# Patient Record
Sex: MALE | Race: WHITE | NOT HISPANIC OR LATINO | URBAN - METROPOLITAN AREA
[De-identification: names, ages, dates, MRNs, and addresses within clinical notes are randomized per-mention and may not be internally consistent; named-entity substitution may affect disease eponyms.]

---

## 2023-07-16 ENCOUNTER — INPATIENT (INPATIENT)
Facility: HOSPITAL | Age: 83
LOS: 2 days | Discharge: HOME | DRG: 71 | End: 2023-07-19
Attending: HOSPITALIST | Admitting: STUDENT IN AN ORGANIZED HEALTH CARE EDUCATION/TRAINING PROGRAM
Payer: MEDICARE

## 2023-07-16 VITALS
RESPIRATION RATE: 17 BRPM | TEMPERATURE: 98 F | WEIGHT: 143.3 LBS | HEART RATE: 67 BPM | HEIGHT: 69 IN | DIASTOLIC BLOOD PRESSURE: 83 MMHG | OXYGEN SATURATION: 99 % | SYSTOLIC BLOOD PRESSURE: 148 MMHG

## 2023-07-16 LAB
ALBUMIN SERPL ELPH-MCNC: 3.9 G/DL — SIGNIFICANT CHANGE UP (ref 3.4–5)
ALP SERPL-CCNC: 81 U/L — SIGNIFICANT CHANGE UP (ref 40–120)
ALT FLD-CCNC: 24 U/L — SIGNIFICANT CHANGE UP (ref 12–42)
AMPHET UR-MCNC: NEGATIVE — SIGNIFICANT CHANGE UP
ANION GAP SERPL CALC-SCNC: 11 MMOL/L — SIGNIFICANT CHANGE UP (ref 9–16)
APPEARANCE UR: CLEAR — SIGNIFICANT CHANGE UP
AST SERPL-CCNC: 30 U/L — SIGNIFICANT CHANGE UP (ref 15–37)
BACTERIA # UR AUTO: PRESENT /HPF — SIGNIFICANT CHANGE UP
BARBITURATES UR SCN-MCNC: NEGATIVE — SIGNIFICANT CHANGE UP
BASOPHILS # BLD AUTO: 0.03 K/UL — SIGNIFICANT CHANGE UP (ref 0–0.2)
BASOPHILS NFR BLD AUTO: 0.3 % — SIGNIFICANT CHANGE UP (ref 0–2)
BENZODIAZ UR-MCNC: NEGATIVE — SIGNIFICANT CHANGE UP
BILIRUB SERPL-MCNC: 0.7 MG/DL — SIGNIFICANT CHANGE UP (ref 0.2–1.2)
BILIRUB UR-MCNC: NEGATIVE — SIGNIFICANT CHANGE UP
BUN SERPL-MCNC: 11 MG/DL — SIGNIFICANT CHANGE UP (ref 7–23)
CALCIUM SERPL-MCNC: 9.4 MG/DL — SIGNIFICANT CHANGE UP (ref 8.5–10.5)
CHLORIDE SERPL-SCNC: 97 MMOL/L — SIGNIFICANT CHANGE UP (ref 96–108)
CO2 SERPL-SCNC: 26 MMOL/L — SIGNIFICANT CHANGE UP (ref 22–31)
COCAINE METAB.OTHER UR-MCNC: NEGATIVE — SIGNIFICANT CHANGE UP
COLOR SPEC: YELLOW — SIGNIFICANT CHANGE UP
CREAT SERPL-MCNC: 0.93 MG/DL — SIGNIFICANT CHANGE UP (ref 0.5–1.3)
DIFF PNL FLD: NEGATIVE — SIGNIFICANT CHANGE UP
EGFR: 83 ML/MIN/1.73M2 — SIGNIFICANT CHANGE UP
EOSINOPHIL # BLD AUTO: 0.09 K/UL — SIGNIFICANT CHANGE UP (ref 0–0.5)
EOSINOPHIL NFR BLD AUTO: 1 % — SIGNIFICANT CHANGE UP (ref 0–6)
EPI CELLS # UR: PRESENT
ETHANOL SERPL-MCNC: <3 MG/DL — SIGNIFICANT CHANGE UP
GLUCOSE SERPL-MCNC: 112 MG/DL — HIGH (ref 70–99)
GLUCOSE UR QL: NEGATIVE MG/DL — SIGNIFICANT CHANGE UP
HCT VFR BLD CALC: 40 % — SIGNIFICANT CHANGE UP (ref 39–50)
HGB BLD-MCNC: 13.8 G/DL — SIGNIFICANT CHANGE UP (ref 13–17)
IMM GRANULOCYTES NFR BLD AUTO: 0.2 % — SIGNIFICANT CHANGE UP (ref 0–0.9)
KETONES UR-MCNC: NEGATIVE MG/DL — SIGNIFICANT CHANGE UP
LEUKOCYTE ESTERASE UR-ACNC: ABNORMAL
LYMPHOCYTES # BLD AUTO: 1.76 K/UL — SIGNIFICANT CHANGE UP (ref 1–3.3)
LYMPHOCYTES # BLD AUTO: 19.8 % — SIGNIFICANT CHANGE UP (ref 13–44)
MAGNESIUM SERPL-MCNC: 1.9 MG/DL — SIGNIFICANT CHANGE UP (ref 1.6–2.6)
MCHC RBC-ENTMCNC: 33.3 PG — SIGNIFICANT CHANGE UP (ref 27–34)
MCHC RBC-ENTMCNC: 34.5 GM/DL — SIGNIFICANT CHANGE UP (ref 32–36)
MCV RBC AUTO: 96.4 FL — SIGNIFICANT CHANGE UP (ref 80–100)
METHADONE UR-MCNC: NEGATIVE — SIGNIFICANT CHANGE UP
MONOCYTES # BLD AUTO: 1.05 K/UL — HIGH (ref 0–0.9)
MONOCYTES NFR BLD AUTO: 11.8 % — SIGNIFICANT CHANGE UP (ref 2–14)
NEUTROPHILS # BLD AUTO: 5.95 K/UL — SIGNIFICANT CHANGE UP (ref 1.8–7.4)
NEUTROPHILS NFR BLD AUTO: 66.9 % — SIGNIFICANT CHANGE UP (ref 43–77)
NITRITE UR-MCNC: NEGATIVE — SIGNIFICANT CHANGE UP
NRBC # BLD: 0 /100 WBCS — SIGNIFICANT CHANGE UP (ref 0–0)
OPIATES UR-MCNC: NEGATIVE — SIGNIFICANT CHANGE UP
PCP SPEC-MCNC: SIGNIFICANT CHANGE UP
PCP UR-MCNC: NEGATIVE — SIGNIFICANT CHANGE UP
PH UR: 6.5 — SIGNIFICANT CHANGE UP (ref 5–8)
PLATELET # BLD AUTO: 249 K/UL — SIGNIFICANT CHANGE UP (ref 150–400)
POTASSIUM SERPL-MCNC: 3.8 MMOL/L — SIGNIFICANT CHANGE UP (ref 3.5–5.3)
POTASSIUM SERPL-SCNC: 3.8 MMOL/L — SIGNIFICANT CHANGE UP (ref 3.5–5.3)
PROT SERPL-MCNC: 7.8 G/DL — SIGNIFICANT CHANGE UP (ref 6.4–8.2)
PROT UR-MCNC: NEGATIVE MG/DL — SIGNIFICANT CHANGE UP
RBC # BLD: 4.15 M/UL — LOW (ref 4.2–5.8)
RBC # FLD: 13.2 % — SIGNIFICANT CHANGE UP (ref 10.3–14.5)
RBC CASTS # UR COMP ASSIST: 0 /HPF — SIGNIFICANT CHANGE UP (ref 0–4)
SODIUM SERPL-SCNC: 134 MMOL/L — SIGNIFICANT CHANGE UP (ref 132–145)
SP GR SPEC: 1.01 — SIGNIFICANT CHANGE UP (ref 1–1.03)
THC UR QL: NEGATIVE — SIGNIFICANT CHANGE UP
UROBILINOGEN FLD QL: 1 MG/DL — SIGNIFICANT CHANGE UP (ref 0.2–1)
WBC # BLD: 8.9 K/UL — SIGNIFICANT CHANGE UP (ref 3.8–10.5)
WBC # FLD AUTO: 8.9 K/UL — SIGNIFICANT CHANGE UP (ref 3.8–10.5)
WBC UR QL: 3 /HPF — SIGNIFICANT CHANGE UP (ref 0–5)

## 2023-07-16 PROCEDURE — 70450 CT HEAD/BRAIN W/O DYE: CPT | Mod: 26,MH

## 2023-07-16 PROCEDURE — 71046 X-RAY EXAM CHEST 2 VIEWS: CPT | Mod: 26

## 2023-07-16 PROCEDURE — 99285 EMERGENCY DEPT VISIT HI MDM: CPT | Mod: FS

## 2023-07-16 RX ORDER — DIVALPROEX SODIUM 500 MG/1
500 TABLET, DELAYED RELEASE ORAL ONCE
Refills: 0 | Status: COMPLETED | OUTPATIENT
Start: 2023-07-16 | End: 2023-07-16

## 2023-07-16 RX ORDER — ALBUTEROL 90 UG/1
1 AEROSOL, METERED ORAL ONCE
Refills: 0 | Status: COMPLETED | OUTPATIENT
Start: 2023-07-16 | End: 2023-07-16

## 2023-07-16 RX ORDER — OLANZAPINE 15 MG/1
10 TABLET, FILM COATED ORAL ONCE
Refills: 0 | Status: COMPLETED | OUTPATIENT
Start: 2023-07-16 | End: 2023-07-16

## 2023-07-16 RX ORDER — SODIUM CHLORIDE 9 MG/ML
1000 INJECTION INTRAMUSCULAR; INTRAVENOUS; SUBCUTANEOUS ONCE
Refills: 0 | Status: COMPLETED | OUTPATIENT
Start: 2023-07-16 | End: 2023-07-16

## 2023-07-16 RX ORDER — BUPROPION HYDROCHLORIDE 150 MG/1
300 TABLET, EXTENDED RELEASE ORAL DAILY
Refills: 0 | Status: DISCONTINUED | OUTPATIENT
Start: 2023-07-16 | End: 2023-07-19

## 2023-07-16 RX ADMIN — ALBUTEROL 1 PUFF(S): 90 AEROSOL, METERED ORAL at 15:10

## 2023-07-16 RX ADMIN — BUPROPION HYDROCHLORIDE 300 MILLIGRAM(S): 150 TABLET, EXTENDED RELEASE ORAL at 12:47

## 2023-07-16 RX ADMIN — OLANZAPINE 10 MILLIGRAM(S): 15 TABLET, FILM COATED ORAL at 12:48

## 2023-07-16 RX ADMIN — DIVALPROEX SODIUM 500 MILLIGRAM(S): 500 TABLET, DELAYED RELEASE ORAL at 12:48

## 2023-07-16 RX ADMIN — SODIUM CHLORIDE 2000 MILLILITER(S): 9 INJECTION INTRAMUSCULAR; INTRAVENOUS; SUBCUTANEOUS at 12:30

## 2023-07-16 NOTE — H&P ADULT - PROBLEM SELECTOR PLAN 6
On synthroid 100mcg daily. Pt noted to take cholestyramine as well    - TSH  - continue synthroid 100mcg daily, take only in morning without cholestyramine co-administration

## 2023-07-16 NOTE — ED PROVIDER NOTE - PROGRESS NOTE DETAILS
As per Assisted living supervisor Kiera Wilhelm . Patient cannot return to the resident until he is assessed by psych and social work. As per Assisted living supervisor Kiera Wilhelm . Patient cannot return to the resident until he is assessed by psych and social work for capacity and placement.

## 2023-07-16 NOTE — H&P ADULT - ASSESSMENT
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

## 2023-07-16 NOTE — ED ADULT NURSE NOTE - NSFALLHARMRISKINTERV_ED_ALL_ED

## 2023-07-16 NOTE — ED ADULT NURSE NOTE - CHIEF COMPLAINT QUOTE
patient BIBA from [facility] on [address] for AMS; staff called as patient "seems forgetful"; EMS staff found out patient is from [facility] in [facility]; EMS spoke with emergency contact Kiera Wilhelm ([phone]) who said he eloped from nursing home and there was a missing persons report on him; arrives on 3LNC; is on home 02 but has been withotu for 1 day

## 2023-07-16 NOTE — H&P ADULT - PROBLEM SELECTOR PLAN 1
BIBEMS after being found in hotel lobby confused. Pt is from [facility], supervisor is Kiera Wilhelm and was last seen at facility yesterday at 3:30PM. At time of examination, pt is AAOx3 however admits to being forgetful at times during past year. Pt reports being non-compliant with antipsychotic meds and as a result may have inadequate treatment of his underlying psychological disorders including schizophrenia and bipolar. CT head without acute pathology (has age indeterminant right basal luncar infarct)    - Monitor mental status  - TSH, b12, syph  - continue psychiatric medications

## 2023-07-16 NOTE — H&P ADULT - NSICDXPASTMEDICALHX_GEN_ALL_CORE_FT
PAST MEDICAL HISTORY:  AML (acute myeloid leukemia)     Bipolar disorder     BPH without urinary obstruction     Chronic obstructive pulmonary disease (COPD)     GERD (gastroesophageal reflux disease)     History of depression     HTN (hypertension)     Hypothyroidism     Schizophrenia

## 2023-07-16 NOTE — H&P ADULT - NSHPPHYSICALEXAM_GEN_ALL_CORE
PHYSICAL EXAM:  GENERAL: Pt lying in bed comfortably in NAD  HEAD:  Atraumatic  EYES: EOMI, PERRL, conjunctiva and sclera clear  ENT: Moist mucous membranes  NECK: Supple, No JVD  CHEST/LUNG: Clear to auscultation bilaterally; No rales, rhonchi, wheezing or rubs. Unlabored respirations  HEART: Regular rate and rhythm; No murmurs, rubs, or gallops  ABDOMEN: Bowel sounds present; Large abdomen, soft, nontender with well healed left side abd scar. No guarding or rigidity  EXTREMITIES:  2+ Peripheral Pulses, brisk capillary refill. No clubbing, cyanosis, or edema  NERVOUS SYSTEM:  Alert & Oriented X3, speech clear. Answers questions appropriately. Facial movements symmetrical, no facial droop, tongue protrusion midline. Full and equal 5/5 strength B/L upper and lower extremities. Sensation intact. No motor drift. No deficits   MSK: FROM x 4 extremities   SKIN: No rashes or lesions

## 2023-07-16 NOTE — ED PROVIDER NOTE - CLINICAL SUMMARY MEDICAL DECISION MAKING FREE TEXT BOX
PMHx COPD on home O2 2L, bipolar d/o, depression, schizophrenia, HTN BIB EMS for confusion. patient has been missing from his Assisted living since 3:30p yesterday. States that he left his facility because he was bored. denies any fall, head trauma, chest pain. admits to chronic SOB and cough. on exam patient is welling appearing, neurovascularly intact. will check labs, do imaging and continue to monitor.

## 2023-07-16 NOTE — ED ADULT NURSE NOTE - OBJECTIVE STATEMENT
Pt BIBA from [facility] on [address] for AMS; staff called as patient "seems forgetful".EMS staff found out patient is from [facility] in [facility]; EMS spoke with emergency contact Kiera Melonie ([phone]) who said he eloped from nursing home and there was a missing persons report on him; arrives on 3LNC; is on home 02 but has been without for 1 day. Pt denies all complaints, unsure why he is in the ER.

## 2023-07-16 NOTE — H&P ADULT - HISTORY OF PRESENT ILLNESS
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 Patient is from [facility] and per pt he left yesterday to come to [facility] to explore the [place] and wanted to "look for a job" as he does not want to be retired. Patient is alert and oriented x3 and answering questions appropriately. Does not like taking his antipsychotics and sometimes has poor compliance with them, reportedly has not taken them for last ~3 days. Recently diagnosed with AML and started on imatinib. No recent illness. Does endorse some dyspnea on exertion today but is comfortable in bed. Denies headache, vision change, dizziness, fever, chills, chest pain, abd pain, dysuria.    In the ED:    - VS: Tmax: 98.2, HR: 67, BP: 148/83, RR: 17, O2: 95% 3L NC    - Pertinent Labs: UA trace LE    - Imaging: CTH: AGe-appropriate involutional changes. Age indeterminant right basal gangilal lacunar infarct. No acute intracranial hemorrhage.  CXR: mild blunting of costophrenic angle, possible bilateral small pleural effusion  EKG:     - Treatment/interventions: bupropion 300mg x1, depakote 500mg x1, zyprexa 10mg x1, 1L NS, albuterol x1    PMHx: COPD (home 2L), schizophrenia, bipolar disorder, depression, HTN, BPH, GERD, hypothyroidism, recently diagnosed AML   PSHx: see below  Meds: See med rec  Allergies: NKDA  Social: see below

## 2023-07-16 NOTE — H&P ADULT - PROBLEM SELECTOR PLAN 2
On home 2L NC and trelegy ellipta 1 puff daily    - continue O2 goal O2 88% or higher  - Symbicort and Spiriva daily  - duonebs q6h PRN

## 2023-07-16 NOTE — H&P ADULT - PROBLEM SELECTOR PLAN 8
Mildly elevated BP at this time. No anti-HTN med on medication list from facility    - Start anti-HTN as needed

## 2023-07-16 NOTE — H&P ADULT - PROBLEM SELECTOR PLAN 5
On buproprion 300mgdaily, buspirone 5mg tid, sertraline 25mg daily, zyprexa 10mg morning and 7.5mg bedtime. No suicidal thoughts or thoughts of self harm at this time. Reports mood as okay and stable    - continue buproprion 300mgdaily, buspirone 5mg tid, sertraline 25mg daily, zyprexa 10mg morning and 7.5mg bedtime

## 2023-07-16 NOTE — H&P ADULT - NSHPLABSRESULTS_GEN_ALL_CORE
LABS:  cret                        13.8   8.90  )-----------( 249      ( 16 Jul 2023 11:59 )             40.0     07-16    134  |  97  |  11  ----------------------------<  112<H>  3.8   |  26  |  0.93    Ca    9.4      16 Jul 2023 11:59  Mg     1.9     07-16    TPro  7.8  /  Alb  3.9  /  TBili  0.7  /  DBili  x   /  AST  30  /  ALT  24  /  AlkPhos  81  07-16

## 2023-07-16 NOTE — H&P ADULT - ATTENDING COMMENTS
Patient was seen and examined at bedside on 7/17/2023 at 330 pm. Patient reports that he feels well. Denies CP, SOB. ROS is otherwise negative. Vitals, labwork and pertinent imaging reviewed. Physical exam - NAD, AAO x 4, PERRLA, EOMI, supple neck, chest - CTA b/l, CV - rrr, s1s2, no m/r/g, abd - soft, + BS, ext - wwp, psych - normal affect, skin - no rash, back - midline    Plan  -Patient is medically ready for d/c  -Needs to have nodule seen on CXR followed up as an outpatient

## 2023-07-16 NOTE — ED ADULT TRIAGE NOTE - CHIEF COMPLAINT QUOTE
patient BIBA from [facility] on [address] for AMS; staff called as patient "seems forgetful"; EMS staff found out patient is from [facility] in [facility]; EMS spoke with emergency contact Kiera Wilhelm ([phone]) who said he eloped from nursing home and there was a missing persons report on him; arrives on 3LNC; is on home 02 but has been withotu for 1 day

## 2023-07-16 NOTE — H&P ADULT - PROBLEM SELECTOR PLAN 7
Unclear when patient was diagnosed with AML. Per patient, it was recent and takes imatinib 400mg daily    - Obtain collateral for nursing home regarding AML diagnosis and restart imatinib as appropriate

## 2023-07-16 NOTE — H&P ADULT - NSHPSOCIALHISTORY_GEN_ALL_CORE
Lives at [facility] living [facility]. Smokes 1-3 cigs/day. No alcohol or recreational drug use.

## 2023-07-16 NOTE — ED PROVIDER NOTE - OBJECTIVE STATEMENT
PMHX COPD on 2L home O2 PMHX COPD on 2L home O2, schizophrenia, bipolar d/o, depression, HTN, BPH BIB EMS for AMS. as per EMS patient was found confused in the lobby of a hotel. Patient admits to chronic cough and wheezing. does not recall his medications. states that he left his residence in [facility] because he was bored and decided [place] come to the city. As per [facility] supervisor Kiera Wilhelm patient was last seen at the residence at 3:30p yesterday, has not taken his medications since that time. PMHX COPD on 2L home O2, schizophrenia, bipolar d/o, depression, HTN, BPH BIB EMS for AMS. as per EMS patient was found confused in the lobby of a hotel. Patient admits to chronic cough and wheezing. does not recall his medications. states that he left his residence in [facility] because he was bored and decided to come to the city. Denies any suicidal homicidal ideation. As per [facility] supervisor Kiera Wilhelm patient was last seen at the residence at 3:30p yesterday, has not taken his medications since that time.  Psych medications:   Bupropion XL 300mg daily  Buspirone 5mg tid  divalproic acid 500mg bid  zyprexa 10mg qd, 7.5mg qhs  sertraline 50mg daily    med list scanned into chart

## 2023-07-16 NOTE — ED ADULT TRIAGE NOTE - WEIGHT IN LBS
Patient returned phone call and was notified of blood test results. She verbalized understanding and is in agreement to start statin therapy. She has never taken statin therapy before.    [facility] on [address] in [place] on file.   143.3

## 2023-07-16 NOTE — H&P ADULT - PROBLEM SELECTOR PLAN 10
Fluids: None  Electrolytes: Replete K<4 and Mg<2  Nutrition: Regular diet  DVT ppx: lovenox  Code: Full code  Dispo: [facility]

## 2023-07-16 NOTE — ED PROVIDER NOTE - ATTENDING APP SHARED VISIT CONTRIBUTION OF CARE
pt presents by ems, he escaped from a nursing home facility outside of [place]. Will get basic work up and likely admit for social evaluation to try to place pt back. no social work available at this time.

## 2023-07-16 NOTE — H&P ADULT - PROBLEM SELECTOR PLAN 4
On divalproic 500mg bid, zyprexa 10mg morning and 7.5mg bedtime    - continue divalproic 500mg bid, zyprexa 10mg morning and 7.5mg bedtime

## 2023-07-16 NOTE — H&P ADULT - PROBLEM SELECTOR PLAN 3
On divalproic 500mg bid, zyprexa 10mg morning and 7.5mg bedtime,     -  continue divalproic 500mg bid, zyprexa 10mg morning and 7.5mg bedtime

## 2023-07-17 ENCOUNTER — TRANSCRIPTION ENCOUNTER (OUTPATIENT)
Age: 83
End: 2023-07-17

## 2023-07-17 DIAGNOSIS — Z86.59 PERSONAL HISTORY OF OTHER MENTAL AND BEHAVIORAL DISORDERS: ICD-10-CM

## 2023-07-17 DIAGNOSIS — K21.9 GASTRO-ESOPHAGEAL REFLUX DISEASE WITHOUT ESOPHAGITIS: ICD-10-CM

## 2023-07-17 DIAGNOSIS — Z90.49 ACQUIRED ABSENCE OF OTHER SPECIFIED PARTS OF DIGESTIVE TRACT: Chronic | ICD-10-CM

## 2023-07-17 DIAGNOSIS — C92.00 ACUTE MYELOBLASTIC LEUKEMIA, NOT HAVING ACHIEVED REMISSION: ICD-10-CM

## 2023-07-17 DIAGNOSIS — Z00.00 ENCOUNTER FOR GENERAL ADULT MEDICAL EXAMINATION WITHOUT ABNORMAL FINDINGS: ICD-10-CM

## 2023-07-17 DIAGNOSIS — J44.9 CHRONIC OBSTRUCTIVE PULMONARY DISEASE, UNSPECIFIED: ICD-10-CM

## 2023-07-17 DIAGNOSIS — Z29.9 ENCOUNTER FOR PROPHYLACTIC MEASURES, UNSPECIFIED: ICD-10-CM

## 2023-07-17 DIAGNOSIS — F31.9 BIPOLAR DISORDER, UNSPECIFIED: ICD-10-CM

## 2023-07-17 DIAGNOSIS — E03.9 HYPOTHYROIDISM, UNSPECIFIED: ICD-10-CM

## 2023-07-17 DIAGNOSIS — F20.9 SCHIZOPHRENIA, UNSPECIFIED: ICD-10-CM

## 2023-07-17 DIAGNOSIS — G93.41 METABOLIC ENCEPHALOPATHY: ICD-10-CM

## 2023-07-17 DIAGNOSIS — I10 ESSENTIAL (PRIMARY) HYPERTENSION: ICD-10-CM

## 2023-07-17 DIAGNOSIS — Z98.890 OTHER SPECIFIED POSTPROCEDURAL STATES: Chronic | ICD-10-CM

## 2023-07-17 DIAGNOSIS — N40.0 BENIGN PROSTATIC HYPERPLASIA WITHOUT LOWER URINARY TRACT SYMPTOMS: ICD-10-CM

## 2023-07-17 LAB
ANION GAP SERPL CALC-SCNC: 4 MMOL/L — LOW (ref 5–17)
BUN SERPL-MCNC: 6 MG/DL — LOW (ref 7–23)
CALCIUM SERPL-MCNC: 8.4 MG/DL — SIGNIFICANT CHANGE UP (ref 8.4–10.5)
CHLORIDE SERPL-SCNC: 105 MMOL/L — SIGNIFICANT CHANGE UP (ref 96–108)
CO2 SERPL-SCNC: 27 MMOL/L — SIGNIFICANT CHANGE UP (ref 22–31)
CREAT SERPL-MCNC: 0.8 MG/DL — SIGNIFICANT CHANGE UP (ref 0.5–1.3)
EGFR: 89 ML/MIN/1.73M2 — SIGNIFICANT CHANGE UP
GLUCOSE SERPL-MCNC: 86 MG/DL — SIGNIFICANT CHANGE UP (ref 70–99)
HCT VFR BLD CALC: 39 % — SIGNIFICANT CHANGE UP (ref 39–50)
HGB BLD-MCNC: 13 G/DL — SIGNIFICANT CHANGE UP (ref 13–17)
MAGNESIUM SERPL-MCNC: 1.8 MG/DL — SIGNIFICANT CHANGE UP (ref 1.6–2.6)
MCHC RBC-ENTMCNC: 32.8 PG — SIGNIFICANT CHANGE UP (ref 27–34)
MCHC RBC-ENTMCNC: 33.3 GM/DL — SIGNIFICANT CHANGE UP (ref 32–36)
MCV RBC AUTO: 98.5 FL — SIGNIFICANT CHANGE UP (ref 80–100)
NRBC # BLD: 0 /100 WBCS — SIGNIFICANT CHANGE UP (ref 0–0)
PHOSPHATE SERPL-MCNC: 2.3 MG/DL — LOW (ref 2.5–4.5)
PLATELET # BLD AUTO: 219 K/UL — SIGNIFICANT CHANGE UP (ref 150–400)
POTASSIUM SERPL-MCNC: 4 MMOL/L — SIGNIFICANT CHANGE UP (ref 3.5–5.3)
POTASSIUM SERPL-SCNC: 4 MMOL/L — SIGNIFICANT CHANGE UP (ref 3.5–5.3)
RBC # BLD: 3.96 M/UL — LOW (ref 4.2–5.8)
RBC # FLD: 13.4 % — SIGNIFICANT CHANGE UP (ref 10.3–14.5)
SODIUM SERPL-SCNC: 136 MMOL/L — SIGNIFICANT CHANGE UP (ref 135–145)
T PALLIDUM AB TITR SER: NEGATIVE — SIGNIFICANT CHANGE UP
TSH SERPL-MCNC: 2.28 UIU/ML — SIGNIFICANT CHANGE UP (ref 0.27–4.2)
VIT B12 SERPL-MCNC: 800 PG/ML — SIGNIFICANT CHANGE UP (ref 232–1245)
WBC # BLD: 7.78 K/UL — SIGNIFICANT CHANGE UP (ref 3.8–10.5)
WBC # FLD AUTO: 7.78 K/UL — SIGNIFICANT CHANGE UP (ref 3.8–10.5)

## 2023-07-17 PROCEDURE — 99222 1ST HOSP IP/OBS MODERATE 55: CPT

## 2023-07-17 PROCEDURE — 99223 1ST HOSP IP/OBS HIGH 75: CPT | Mod: GC,AI

## 2023-07-17 RX ORDER — PREGABALIN 225 MG/1
1 CAPSULE ORAL
Refills: 0 | DISCHARGE

## 2023-07-17 RX ORDER — CHOLESTYRAMINE 4 G/9G
4 POWDER, FOR SUSPENSION ORAL
Refills: 0 | DISCHARGE

## 2023-07-17 RX ORDER — TIOTROPIUM BROMIDE 18 UG/1
2 CAPSULE ORAL; RESPIRATORY (INHALATION) DAILY
Refills: 0 | Status: DISCONTINUED | OUTPATIENT
Start: 2023-07-17 | End: 2023-07-19

## 2023-07-17 RX ORDER — TAMSULOSIN HYDROCHLORIDE 0.4 MG/1
0.4 CAPSULE ORAL AT BEDTIME
Refills: 0 | Status: DISCONTINUED | OUTPATIENT
Start: 2023-07-17 | End: 2023-07-19

## 2023-07-17 RX ORDER — PRAZOSIN HCL 2 MG
1 CAPSULE ORAL
Refills: 0 | DISCHARGE

## 2023-07-17 RX ORDER — SODIUM,POTASSIUM PHOSPHATES 278-250MG
1 POWDER IN PACKET (EA) ORAL ONCE
Refills: 0 | Status: COMPLETED | OUTPATIENT
Start: 2023-07-17 | End: 2023-07-17

## 2023-07-17 RX ORDER — ACETAMINOPHEN 500 MG
650 TABLET ORAL ONCE
Refills: 0 | Status: COMPLETED | OUTPATIENT
Start: 2023-07-17 | End: 2023-07-17

## 2023-07-17 RX ORDER — OLANZAPINE 15 MG/1
10 TABLET, FILM COATED ORAL
Refills: 0 | Status: DISCONTINUED | OUTPATIENT
Start: 2023-07-17 | End: 2023-07-19

## 2023-07-17 RX ORDER — BUDESONIDE AND FORMOTEROL FUMARATE DIHYDRATE 160; 4.5 UG/1; UG/1
2 AEROSOL RESPIRATORY (INHALATION)
Refills: 0 | Status: DISCONTINUED | OUTPATIENT
Start: 2023-07-17 | End: 2023-07-19

## 2023-07-17 RX ORDER — SERTRALINE 25 MG/1
25 TABLET, FILM COATED ORAL DAILY
Refills: 0 | Status: DISCONTINUED | OUTPATIENT
Start: 2023-07-17 | End: 2023-07-19

## 2023-07-17 RX ORDER — OLANZAPINE 15 MG/1
7.5 TABLET, FILM COATED ORAL AT BEDTIME
Refills: 0 | Status: DISCONTINUED | OUTPATIENT
Start: 2023-07-17 | End: 2023-07-19

## 2023-07-17 RX ORDER — ENOXAPARIN SODIUM 100 MG/ML
40 INJECTION SUBCUTANEOUS EVERY 24 HOURS
Refills: 0 | Status: DISCONTINUED | OUTPATIENT
Start: 2023-07-17 | End: 2023-07-19

## 2023-07-17 RX ORDER — FOLIC ACID 0.8 MG
1 TABLET ORAL
Refills: 0 | DISCHARGE

## 2023-07-17 RX ORDER — SERTRALINE 25 MG/1
1 TABLET, FILM COATED ORAL
Refills: 0 | DISCHARGE

## 2023-07-17 RX ORDER — OLANZAPINE 15 MG/1
1 TABLET, FILM COATED ORAL
Refills: 0 | DISCHARGE

## 2023-07-17 RX ORDER — THIAMINE MONONITRATE (VIT B1) 100 MG
1 TABLET ORAL
Refills: 0 | DISCHARGE

## 2023-07-17 RX ORDER — DIVALPROEX SODIUM 500 MG/1
500 TABLET, DELAYED RELEASE ORAL
Refills: 0 | Status: DISCONTINUED | OUTPATIENT
Start: 2023-07-17 | End: 2023-07-18

## 2023-07-17 RX ORDER — DIVALPROEX SODIUM 500 MG/1
1 TABLET, DELAYED RELEASE ORAL
Refills: 0 | DISCHARGE

## 2023-07-17 RX ORDER — LEVOTHYROXINE SODIUM 125 MCG
1 TABLET ORAL
Refills: 0 | DISCHARGE

## 2023-07-17 RX ORDER — BUPROPION HYDROCHLORIDE 150 MG/1
1 TABLET, EXTENDED RELEASE ORAL
Refills: 0 | DISCHARGE

## 2023-07-17 RX ORDER — FOLIC ACID 0.8 MG
1 TABLET ORAL DAILY
Refills: 0 | Status: DISCONTINUED | OUTPATIENT
Start: 2023-07-17 | End: 2023-07-19

## 2023-07-17 RX ORDER — IMATINIB MESYLATE 400 MG/1
1 TABLET, FILM COATED ORAL
Refills: 0 | DISCHARGE

## 2023-07-17 RX ORDER — FLUTICASONE FUROATE, UMECLIDINIUM BROMIDE AND VILANTEROL TRIFENATATE 200; 62.5; 25 UG/1; UG/1; UG/1
1 POWDER RESPIRATORY (INHALATION)
Refills: 0 | DISCHARGE

## 2023-07-17 RX ORDER — HYDROXYZINE HCL 10 MG
1 TABLET ORAL
Refills: 0 | DISCHARGE

## 2023-07-17 RX ORDER — LEVOTHYROXINE SODIUM 125 MCG
100 TABLET ORAL DAILY
Refills: 0 | Status: DISCONTINUED | OUTPATIENT
Start: 2023-07-17 | End: 2023-07-19

## 2023-07-17 RX ORDER — METHYLCELLULOSE 500 MG
2 TABLET ORAL
Refills: 0 | DISCHARGE

## 2023-07-17 RX ADMIN — TIOTROPIUM BROMIDE 2 PUFF(S): 18 CAPSULE ORAL; RESPIRATORY (INHALATION) at 12:57

## 2023-07-17 RX ADMIN — Medication 1 TABLET(S): at 10:07

## 2023-07-17 RX ADMIN — SERTRALINE 25 MILLIGRAM(S): 25 TABLET, FILM COATED ORAL at 12:56

## 2023-07-17 RX ADMIN — Medication 5 MILLIGRAM(S): at 14:31

## 2023-07-17 RX ADMIN — OLANZAPINE 10 MILLIGRAM(S): 15 TABLET, FILM COATED ORAL at 10:07

## 2023-07-17 RX ADMIN — ENOXAPARIN SODIUM 40 MILLIGRAM(S): 100 INJECTION SUBCUTANEOUS at 07:00

## 2023-07-17 RX ADMIN — Medication 1 MILLIGRAM(S): at 12:56

## 2023-07-17 RX ADMIN — Medication 5 MILLIGRAM(S): at 07:01

## 2023-07-17 RX ADMIN — Medication 5 MILLIGRAM(S): at 21:20

## 2023-07-17 RX ADMIN — Medication 650 MILLIGRAM(S): at 16:03

## 2023-07-17 RX ADMIN — Medication 100 MICROGRAM(S): at 07:01

## 2023-07-17 RX ADMIN — DIVALPROEX SODIUM 500 MILLIGRAM(S): 500 TABLET, DELAYED RELEASE ORAL at 10:09

## 2023-07-17 RX ADMIN — OLANZAPINE 7.5 MILLIGRAM(S): 15 TABLET, FILM COATED ORAL at 21:20

## 2023-07-17 RX ADMIN — TAMSULOSIN HYDROCHLORIDE 0.4 MILLIGRAM(S): 0.4 CAPSULE ORAL at 21:20

## 2023-07-17 RX ADMIN — Medication 650 MILLIGRAM(S): at 16:48

## 2023-07-17 RX ADMIN — BUPROPION HYDROCHLORIDE 300 MILLIGRAM(S): 150 TABLET, EXTENDED RELEASE ORAL at 12:56

## 2023-07-17 NOTE — DISCHARGE NOTE PROVIDER - NSDCCPCAREPLAN_GEN_ALL_CORE_FT
PRINCIPAL DISCHARGE DIAGNOSIS  Diagnosis: Altered mental status  Assessment and Plan of Treatment: Altered mental status is a change in how well your brain is working. As a result, you may be confused, less alert than usual, or act in odd ways. A mental status change can have multiple causes. For example, it may be due to an imbalance of chemicals in the body or due to a chronic disease such as diabetes or COPD. It can also be caused by things like head injury, certain medicines, or alcohol and drugs. When you arrived to the hospital, we performed a number of tests to assess for the causes in the change in your mental status. We performed a urine test, multiple blood tests, and a CT scan of your head. Your urine test and your blood tests did not identify a cause for your change in mental status. You mentioned that you missed a few doses of your psychiatric medications and we suspect that this may have caused your confusion. We consulted psychiatry to see if they had any recommendations regarding your medications.  Mental status can sometimes return to normal without treatment. If you notice any changes in the future, please make sure to seek medical treatment and to follow-up with your primary care provider.

## 2023-07-17 NOTE — BH CONSULTATION LIAISON ASSESSMENT NOTE - OTHER PAST PSYCHIATRIC HISTORY (INCLUDE DETAILS REGARDING ONSET, COURSE OF ILLNESS, INPATIENT/OUTPATIENT TREATMENT)
Patient reported being diagnosed with unspecified mental illness in 1983. Patient reports SA 50 years ago because he did not like his job. Patient did not disclose more details on SA.  Patient reported being diagnosed with unspecified mental illness in 1983. Patient reports SA 50 years ago because he did not like his job. Patient did not disclose more details on SA.     Per psyckes, diagnoses of Schizoaffective disorder, MDD, Other bipolar disorder, schizophrenia.

## 2023-07-17 NOTE — BH CONSULTATION LIAISON ASSESSMENT NOTE - CURRENT MEDICATION
MEDICATIONS  (STANDING):  budesonide 160 MICROgram(s)/formoterol 4.5 MICROgram(s) Inhaler 2 Puff(s) Inhalation two times a day  buPROPion XL (24-Hour) . 300 milliGRAM(s) Oral daily  busPIRone 5 milliGRAM(s) Oral three times a day  divalproex  milliGRAM(s) Oral <User Schedule>  enoxaparin Injectable 40 milliGRAM(s) SubCutaneous every 24 hours  folic acid 1 milliGRAM(s) Oral daily  levothyroxine 100 MICROGram(s) Oral daily  OLANZapine 7.5 milliGRAM(s) Oral at bedtime  OLANZapine 10 milliGRAM(s) Oral <User Schedule>  sertraline 25 milliGRAM(s) Oral daily  tamsulosin 0.4 milliGRAM(s) Oral at bedtime  tiotropium 2.5 MICROgram(s) Inhaler 2 Puff(s) Inhalation daily    MEDICATIONS  (PRN):

## 2023-07-17 NOTE — DISCHARGE NOTE PROVIDER - NSDCCPTREATMENT_GEN_ALL_CORE_FT
PRINCIPAL PROCEDURE  Procedure: CT head  Findings and Treatment: IMPRESSION: Age-appropriate involutional changes. Ageindeterminant right basal ganglial lacunar infarct. No large arterial distribution acute   infarct. No acute intracranial hemorrhage. If altered mental status   persists, consider further evaluation via MR imaging to include DWI and   ADC mapping techniques, provided there are no contraindications.     PRINCIPAL PROCEDURE  Procedure: CT head  Findings and Treatment: IMPRESSION: Age-appropriate involutional changes. Ageindeterminant right basal ganglial lacunar infarct. No large arterial distribution acute   infarct. No acute intracranial hemorrhage. If altered mental status   persists, consider further evaluation via MR imaging to include DWI and   ADC mapping techniques, provided there are no contraindications.      SECONDARY PROCEDURE  Procedure: X-ray, chest, 2 views  Findings and Treatment: Impression: Hyperinflation. Left basilar 1.9 cm nodule, peripheral   Fleischner line. CT chest recommended Bilateral bullous emphysema,,   bibasilar opacities/focal atelectasis, left upper lobe granulomas. Heart   and mediastinum are unremarkable. Thoracic spine and bilateral shoulder degenerative changes. Upper abdominal surgical clips.  ********PLEASE FOLLOW UP OUTPATIENT CT***********

## 2023-07-17 NOTE — PROGRESS NOTE ADULT - PROBLEM SELECTOR PLAN 6
On synthroid 100mcg daily. Pt noted to take cholestyramine as well    - continue synthroid 100mcg daily, take only in morning without cholestyramine co-administration

## 2023-07-17 NOTE — DISCHARGE NOTE PROVIDER - CARE PROVIDER_API CALL
Shayne Miller)  Internal Medicine  [address], [address] [place] [address]  Phone: [phone]  Fax: [phone]  Follow Up Time:

## 2023-07-17 NOTE — DISCHARGE NOTE PROVIDER - ATTENDING DISCHARGE PHYSICAL EXAMINATION:
Patient was seen and examined at bedside on 7/19/2023 at 1230 pm. Patient reports that he feels well. Denies CP, SOB. ROS is otherwise negative. Vitals, labwork and pertinent imaging reviewed. Physical exam - NAD, AAO x 4, PERRLA, EOMI, supple neck, chest - CTA b/l, CV - rrr, s1s2, no m/r/g, abd - soft, + BS, ext - wwp, psych - normal affect, skin - no rash, back - midline    Plan  -Patient is medically ready for d/c  -Needs to have nodule seen on CXR followed up as an outpatient

## 2023-07-17 NOTE — CONSULT NOTE ADULT - ASSESSMENT
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    Problem/Plan - 1:  ·  Problem: Metabolic encephalopathy.   ·  Plan: BIBEMS after being found in hotel lobby confused. Pt is from [place] assisted living [facility], supervisor is Kiera Wilhelm and was last seen at facility yesterday at 3:30PM. At time of examination, pt is AAOx3 however admits to being forgetful at times during past year. Pt reports being non-compliant with antipsychotic meds and as a result may have inadequate treatment of his underlying psychological disorders including schizophrenia and bipolar. 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    Problem/Plan - 2:  ·  Problem: Chronic obstructive pulmonary disease (COPD).   ·  Plan: On home 2L NC and trelegy ellipta 1 puff daily    - continue O2 goal O2 88% or higher  - Symbicort and Spiriva daily  - duonebs q6h PRN.    Problem/Plan - 3:  ·  Problem: Schizophrenia.   ·  Plan: On divalproic 500mg bid, zyprexa 10mg morning and 7.5mg bedtime,     -  continue divalproic 500mg bid, zyprexa 10mg morning and 7.5mg bedtime.    Problem/Plan - 4:  ·  Problem: Bipolar disorder.   ·  Plan: On divalproic 500mg bid, zyprexa 10mg morning and 7.5mg bedtime    - continue divalproic 500mg bid, zyprexa 10mg morning and 7.5mg bedtime.    Problem/Plan - 5:  ·  Problem: History of depression.   ·  Plan: On buproprion 300mgdaily, buspirone 5mg tid, sertraline 25mg daily, zyprexa 10mg morning and 7.5mg bedtime. No suicidal thoughts or thoughts of self harm at this time. Reports mood as okay and stable    - continue buproprion 300mgdaily, buspirone 5mg tid, sertraline 25mg daily, zyprexa 10mg morning and 7.5mg bedtime.    Problem/Plan - 6:  ·  Problem: Hypothyroidism.   ·  Plan: On synthroid 100mcg daily. Pt noted to take cholestyramine as well    - TSH  - continue synthroid 100mcg daily, take only in morning without cholestyramine co-administration.    Problem/Plan - 7:  ·  Problem: AML (acute myeloid leukemia).   ·  Plan: Unclear when patient was diagnosed with AML. Per patient, it was recent and takes imatinib 400mg daily    - Obtain collateral for nursing home regarding AML diagnosis and restart imatinib as appropriate.    Problem/Plan - 8:  ·  Problem: HTN (hypertension).   ·  Plan: Mildly elevated BP at this time. No anti-HTN med on medication list from facility    - Start anti-HTN as needed.    Problem/Plan - 9:  ·  Problem: BPH without urinary obstruction.   ·  Plan: On prazosin 2mg qhs    - flomax 0.4mg qhs.    Problem/Plan - 10:  ·  Problem: Prophylactic measure.   ·  Plan; Fluids: None  Electrolytes: Replete K<4 and Mg<2  Nutrition: Regular diet  DVT ppx: lovenox  Code: Full code  Dispo: [facility].

## 2023-07-17 NOTE — BH CONSULTATION LIAISON ASSESSMENT NOTE - NSBHATTESTCOMMENTATTENDFT_PSY_A_CORE
Case seen with and discussed with resident. I agree with prior documentation, with the addition of the following:    This is an 83 yo M domiciled at an assisted living facility in [place], [place], PMH COPD, PPHx schizophrenia vs schizoaffective disorder on wellbutrin, zyprexa, depakote, and sertraline, history of remote psychiatric hospitalization and suicide attempt (50 years ago), BIBEMS after being found at [facility] in [place] appearing confused. Pt had evidently left his assisted living facility on Friday without signing out appropriately, and traveled to [place] on his own; states he was bored in [place] and was "looking for work" in [place]. 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 Pt seen for psychiatric eval prior to his transfer back to facility. Compared to admission, his sensorium appears improved; he is A&O x3 and is able to linearly narrate his presentation to the hospital. He also denies all acute psychiatric sx, including denies SI/HI/AVH; he has been adherent to his meds as ordered in the hospital. He appears to have chronically impaired insight and judgment given his recent elopement from facility, though states he wants to be transferred to an adult home in [facility]. He is psychiatrically stable at this time, but would recommend continued close follow up with his outpatient psychiatrist at his facility to continue to evaluate his insight/judgment and reinforce safety precautions. Pt was informed of plan for transfer back to assisted living in [place], but was informed of steps to begin taking to try to transfer care elsewhere if he desires. Assisted Living Facility was contacted by this writer and informed of plan. His chronic risk factors due to his psychiatric illness would be best mitigated by continued outpatient care in the community.

## 2023-07-17 NOTE — BH CONSULTATION LIAISON ASSESSMENT NOTE - DIFFERENTIAL
Pt's historical diagnoses include schizophrenia vs schizoaffective do, MDD, bipolar disorder. He is not currently in an acute psychiatric decompensation of one of these illnesses.

## 2023-07-17 NOTE — DISCHARGE NOTE PROVIDER - HOSPITAL COURSE
82M PMHx COPD (home 2L), schizophrenia, bipolar disorder, depression, HTN, BPH, GERD, hypothyroidism, recently diagnosed AML (on imatinib) BIBEMS to [facility] after being found confused in the lobby of a hotel admitted for management of confusion and assistance with disposition    Problem/Plan - 1:  ·  Problem: Metabolic encephalopathy.   ·  Plan: BIBEMS after being found in hotel lobby confused. Pt is from [facility] living [facility], supervisor is Kiera Wilhelm and was last seen at facility yesterday at 3:30PM. At time of examination, pt is AAOx3 however admits to being forgetful at times during past year. Pt reports being non-compliant with antipsychotic meds and as a result may have inadequate treatment of his underlying psychological disorders including schizophrenia and bipolar. 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     Problem/Plan - 2:  ·  Problem: Chronic obstructive pulmonary disease (COPD).   ·  Plan: On home 2L NC and trelegy ellipta 1 puff daily    - continue O2 goal O2 88% or higher  - Symbicort and Spiriva daily  - duonebs q6h PRN.     Problem/Plan - 3:  ·  Problem: Schizophrenia.   ·  Plan: On divalproic 500mg bid, zyprexa 10mg morning and 7.5mg bedtime,     -  continue divalproic 500mg bid, zyprexa 10mg morning and 7.5mg bedtime.     Problem/Plan - 4:  ·  Problem: Bipolar disorder.   ·  Plan: On divalproic 500mg bid, zyprexa 10mg morning and 7.5mg bedtime    - continue divalproic 500mg bid, zyprexa 10mg morning and 7.5mg bedtime.     Problem/Plan - 5:  ·  Problem: History of depression.   ·  Plan: On buproprion 300mgdaily, buspirone 5mg tid, sertraline 25mg daily, zyprexa 10mg morning and 7.5mg bedtime. No suicidal thoughts or thoughts of self harm at this time. Reports mood as okay and stable    - continue buproprion 300mgdaily, buspirone 5mg tid, sertraline 25mg daily, zyprexa 10mg morning and 7.5mg bedtime.     Problem/Plan - 6:  ·  Problem: Hypothyroidism.   ·  Plan: On synthroid 100mcg daily. Pt noted to take cholestyramine as well    - TSH  - continue synthroid 100mcg daily, take only in morning without cholestyramine co-administration.     Problem/Plan - 7:  ·  Problem: AML (acute myeloid leukemia).   ·  Plan: Unclear when patient was diagnosed with AML. Per patient, it was recent and takes imatinib 400mg daily    - Obtain collateral for nursing home regarding AML diagnosis and restart imatinib as appropriate.     Problem/Plan - 8:  ·  Problem: HTN (hypertension).   ·  Plan: Mildly elevated BP at this time. No anti-HTN med on medication list from facility    - Start anti-HTN as needed.     Problem/Plan - 9:  ·  Problem: BPH without urinary obstruction.   ·  Plan: On prazosin 2mg qhs    - flomax 0.4mg qhs.  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   ·  Plan: BIBEMS after being found in hotel lobby confused. Pt is from [facility] living [facility], supervisor is Kiera Wilhelm and was last seen at facility yesterday at 3:30PM. At time of examination, pt is AAOx3 however admits to being forgetful and confused at times during past year. Pt reports being non-compliant with antipsychotic meds and as a result may have inadequate treatment of his underlying psychological disorders including schizophrenia and bipolar. 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   ·  Plan: On home 2L NC and trelegy ellipta 1 puff daily  - patient saturating well and resting comfortably on NC  - Received Symbicort and Spiriva daily  - duonebs q6h PRN.     Problem/Plan - 3:  ·  Problem: Schizophrenia.   ·  Plan: On divalproic 500mg bid, zyprexa 10mg morning and 7.5mg bedtime,   -  continued divalproic 500mg bid, zyprexa 10mg morning and 7.5mg bedtime.     Problem/Plan - 4:  ·  Problem: Bipolar disorder.   ·  Plan: On divalproic 500mg bid, zyprexa 10mg morning and 7.5mg bedtime  - continued divalproic 500mg bid, zyprexa 10mg morning and 7.5mg bedtime.     Problem/Plan - 5:  ·  Problem: History of depression.   ·  Plan: On buproprion 300mgdaily, buspirone 5mg tid, sertraline 25mg daily, zyprexa 10mg morning and 7.5mg bedtime. No suicidal thoughts or thoughts of self harm at this time. Reports mood as okay and stable  - continued buproprion 300mgdaily, buspirone 5mg tid, sertraline 25mg daily, zyprexa 10mg morning and 7.5mg bedtime.  - psychiatry consulted for medical optimization     Problem/Plan - 6:  ·  Problem: Hypothyroidism.   ·  Plan: On synthroid 100mcg daily. Pt noted to take cholestyramine as well  - TSH normal  - continue synthroid 100mcg daily, take only in morning without cholestyramine co-administration.     Problem/Plan - 7:  ·  Problem: AML (acute myeloid leukemia).   ·  Plan: Unclear when patient was diagnosed with AML. Per patient, it was recent and takes imatinib 400mg daily  - Obtain collateral for nursing home regarding AML diagnosis and restart imatinib as appropriate.     Problem/Plan - 8:  ·  Problem: HTN (hypertension).   ·  Plan: Mildly elevated BP at this time. No anti-HTN med on medication list from facility    - Start anti-HTN as needed.     Problem/Plan - 9:  ·  Problem: BPH without urinary obstruction.   ·  Plan: On prazosin 2mg qhs  - given flomax 0.4mg qhs. Hospital course    82M PMHx COPD (home 2L), schizophrenia, bipolar disorder, depression, HTN, BPH, GERD, hypothyroidism, recently diagnosed AML (on imatinib) BIBEMS to [facility] after being found confused in the lobby of a hotel admitted for management of confusion and assistance with disposition    Problem/Plan - 1:  ·  Problem: Metabolic encephalopathy.   ·  Plan: BIBEMS after being found in hotel lobby confused. Pt is from [place] assisted living [facility], supervisor is Kiera Wilhelm and was last seen at facility yesterday at 3:30PM. At time of examination, pt is AAOx3 however admits to being forgetful and confused at times during past year. Pt reports being non-compliant with antipsychotic meds and as a result may have inadequate treatment of his underlying psychological disorders including schizophrenia and bipolar. 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   ·  Plan: On home 2L NC and trelegy ellipta 1 puff daily  - patient saturating well and resting comfortably on NC  - Received Symbicort and Spiriva daily  - Received duonebs      Problem/Plan - 3:  ·  Problem: Schizophrenia.   ·  Plan: On divalproic 500mg bid, zyprexa 10mg morning and 7.5mg bedtime  -  continued depakote 500mg bid, zyprexa 10mg morning and 7.5mg bedtime.  - patient cleared by psychiatry, continue with home psychiatric medications      Problem/Plan - 4:  ·  Problem: Bipolar disorder.   ·  Plan: On divalproic 500mg bid, zyprexa 10mg morning and 7.5mg bedtime  - continued divalproic 500mg bid, zyprexa 10mg morning and 7.5mg bedtime.  - patient cleared by psychiatry, continue with home psychiatric medications    Problem/Plan - 5:  ·  Problem: History of depression.   ·  Plan: On buproprion 300mgdaily, buspirone 5mg tid, sertraline 25mg daily, zyprexa 10mg morning and 7.5mg bedtime. No suicidal thoughts or thoughts of self harm at this time. Reports mood as okay and stable  - continued buproprion 300mgdaily, buspirone 5mg tid, sertraline 25mg daily, zyprexa 10mg morning and 7.5mg bedtime.  - psychiatry consulted for medical optimization, patient cleared for discharge and instructed to continue taking home medications     Problem/Plan - 6:  ·  Problem: Hypothyroidism.   ·  Plan: On synthroid 100mcg daily. Pt noted to take cholestyramine as well  - TSH normal  - continue synthroid 100mcg daily, take only in morning without cholestyramine co-administration.     Problem/Plan - 7:  ·  Problem: AML (acute myeloid leukemia).   ·  Plan: Unclear when patient was diagnosed with AML. Per patient, it was recent and takes imatinib 400mg daily  - Obtain collateral for nursing home regarding AML diagnosis and restart imatinib as appropriate.     Problem/Plan - 8:  ·  Problem: HTN (hypertension).   ·  Plan: Mildly elevated BP at this time. No anti-HTN med on medication list from facility    - Start anti-HTN as needed.     Problem/Plan - 9:  ·  Problem: BPH without urinary obstruction.   ·  Plan: On prazosin 2mg qhs  - given flomax 0.4mg qhs. 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   ·  Plan: BIBEMS after being found in hotel lobby confused. Pt is from [place] assisted living [facility], supervisor is Kiera Wilhelm and was last seen at facility yesterday at 3:30PM. At time of examination, pt is AAOx3 however admits to being forgetful and confused at times during past year. Pt reports being non-compliant with antipsychotic meds and as a result may have inadequate treatment of his underlying psychological disorders including schizophrenia and bipolar. 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   ·  Plan: On home 2L NC and trelegy ellipta 1 puff daily  - patient saturating well and resting comfortably on NC  - Received Symbicort and Spiriva daily  - Received duonebs     Problem/Plan - 3:  Problem: Lung Nodule  Plan: Follow up Chest CT outpatient  - CXR on admission showing left basilar 1.9cm nodule  - PLEASE FOLLOW UP CHEST CT OUTPATIENT     Problem/Plan - 4:  ·  Problem: Schizophrenia.   ·  Plan: On divalproic 500mg bid, zyprexa 10mg morning and 7.5mg bedtime  -  continued depakote 500mg bid, zyprexa 10mg morning and 7.5mg bedtime.  - patient cleared by psychiatry, continue with home psychiatric medications      Problem/Plan - 5:  ·  Problem: Bipolar disorder.   ·  Plan: On divalproic 500mg bid, zyprexa 10mg morning and 7.5mg bedtime  - continued divalproic 500mg bid, zyprexa 10mg morning and 7.5mg bedtime.  - patient cleared by psychiatry, continue with home psychiatric medications      Problem/Plan - 6:  ·  Problem: History of depression.   ·  Plan: On buproprion 300mgdaily, buspirone 5mg tid, sertraline 25mg daily, zyprexa 10mg morning and 7.5mg bedtime. No suicidal thoughts or thoughts of self harm at this time. Reports mood as okay and stable  - continued buproprion 300mgdaily, buspirone 5mg tid, sertraline 25mg daily, zyprexa 10mg morning and 7.5mg bedtime.  - psychiatry consulted for medical optimization, patient cleared for discharge and instructed to continue taking home medications     Problem/Plan - 7:  ·  Problem: Hypothyroidism.   ·  Plan: On synthroid 100mcg daily. Pt noted to take cholestyramine as well  - TSH normal  - continue synthroid 100mcg daily, take only in morning without cholestyramine co-administration.     Problem/Plan - 8:  ·  Problem: AML (acute myeloid leukemia).   ·  Plan: Unclear when patient was diagnosed with AML. Per patient, it was recent and takes imatinib 400mg daily  - Obtain collateral for nursing home regarding AML diagnosis and restart imatinib as appropriate.     Problem/Plan - 9:  ·  Problem: HTN (hypertension).   ·  Plan: Mildly elevated BP at this time. No anti-HTN med on medication list from facility  - no anti-hypertensives given during admission     Problem/Plan - 9:  ·  Problem: BPH without urinary obstruction.   ·  Plan: On prazosin 2mg qhs  - given flomax 0.4mg qhs. 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   ·  Plan: BIBEMS after being found in hotel lobby confused. Pt is from [place] assisted living [facility], supervisor is Kiera Wilhelm and was last seen at facility yesterday at 3:30PM. At time of examination, pt is AAOx3 however admits to being forgetful and confused at times during past year. Pt reports being non-compliant with antipsychotic meds and as a result may have inadequate treatment of his underlying psychological disorders including schizophrenia and bipolar. 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   ·  Plan: On home 2L NC and trelegy ellipta 1 puff daily  - patient saturating well and resting comfortably on NC  - Received Symbicort and Spiriva daily  - Received duonebs     Problem/Plan - 3:  Problem: Lung Nodule  Plan: Follow up Chest CT outpatient  - CXR on admission showing left basilar 1.9cm nodule  - PLEASE FOLLOW UP CHEST CT OUTPATIENT     Problem/Plan - 4:  ·  Problem: Schizophrenia.   ·  Plan: On divalproic 500mg bid, zyprexa 10mg morning and 7.5mg bedtime  -  continued depakote 500mg bid, zyprexa 10mg morning and 7.5mg bedtime.  - patient cleared by psychiatry, continue with home psychiatric medications      Problem/Plan - 5:  ·  Problem: Bipolar disorder.   ·  Plan: On divalproic 500mg bid, zyprexa 10mg morning and 7.5mg bedtime  - continued divalproic 500mg bid, zyprexa 10mg morning and 7.5mg bedtime.  - patient cleared by psychiatry, continue with home psychiatric medications      Problem/Plan - 6:  ·  Problem: History of depression.   ·  Plan: On buproprion 300mgdaily, buspirone 5mg tid, sertraline 25mg daily, zyprexa 10mg morning and 7.5mg bedtime. No suicidal thoughts or thoughts of self harm at this time. Reports mood as okay and stable  - continued buproprion 300mgdaily, buspirone 5mg tid, sertraline 25mg daily, zyprexa 10mg morning and 7.5mg bedtime.  - psychiatry consulted for medical optimization, patient cleared for discharge and instructed to continue taking home medications     Problem/Plan - 7:  ·  Problem: Hypothyroidism.   ·  Plan: On synthroid 100mcg daily. Pt noted to take cholestyramine as well  - TSH normal  - continue synthroid 100mcg daily, take only in morning without cholestyramine co-administration.     Problem/Plan - 8:  ·  Problem: AML (acute myeloid leukemia).   ·  Plan: Unclear when patient was diagnosed with AML. Per patient, it was recent and takes imatinib 400mg daily  - Obtain collateral for nursing home regarding AML diagnosis and restart imatinib as appropriate.     Problem/Plan - 9:  ·  Problem: HTN (hypertension).   ·  Plan: Mildly elevated BP at this time. No anti-HTN med on medication list from facility  - no anti-hypertensives given during admission     Problem/Plan - 9:  ·  Problem: BPH without urinary obstruction.   ·  Plan: On prazosin 2mg qhs  - given flomax 0.4mg qhs.    General: NAD  Head: Pupils equil and reactive  Neck: Supple; no JVD  Respiratory: CTAB; no wheezes/rales/rhonchi  Cardiovascular: Regular rhythm/rate; S1/S2+, no murmurs, rubs gallops   Gastrointestinal: Soft; NTND; bowel sounds normal and present  Extremities: WWP; no edema/cyanosis  Neurological: A&Ox3, CNII-XII grossly intact; no obvious focal deficits   Hospital course    82M PMHx COPD (home 2L), schizophrenia, bipolar disorder, depression, HTN, BPH, GERD, hypothyroidism, recently diagnosed AML (on imatinib) BIBEMS to [facility] after being found confused in the lobby of a hotel admitted for management of confusion and assistance with disposition    Problem/Plan - 1:  ·  Problem: Metabolic encephalopathy.   ·  Plan: BIBEMS after being found in hotel lobby confused. Pt is from [facility], supervisor is Kiera Wilhelm and was last seen at facility yesterday at 3:30PM. At time of examination, pt is AAOx3 however admits to being forgetful and confused at times during past year. Pt reports being non-compliant with antipsychotic meds and as a result may have inadequate treatment of his underlying psychological disorders including schizophrenia and bipolar. CT head without acute pathology (has age indeterminant right basal luncar infarct)    - Monitored mental status  - TSH and B12 normal  - continue psychiatric medications  - consulted psychiatry for medical optimization     Problem/Plan - 2:  ·  Problem: Oxygen dependent COPD with chronic respiratory failure  ·  Plan: On home 2L NC and trelegy ellipta 1 puff daily  - patient saturating well and resting comfortably on NC  - Received Symbicort and Spiriva daily  - Received duonebs     Problem/Plan - 3:  Problem: Lung Nodule  Plan: Follow up Chest CT outpatient  - CXR on admission showing left basilar 1.9cm nodule  - PLEASE FOLLOW UP CHEST CT OUTPATIENT     Problem/Plan - 4:  ·  Problem: Schizophrenia.   ·  Plan: On divalproic 500mg bid, zyprexa 10mg morning and 7.5mg bedtime  -  continued depakote 500mg bid, zyprexa 10mg morning and 7.5mg bedtime.  - patient cleared by psychiatry, continue with home psychiatric medications      Problem/Plan - 5:  ·  Problem: Bipolar disorder.   ·  Plan: On divalproic 500mg bid, zyprexa 10mg morning and 7.5mg bedtime  - continued divalproic 500mg bid, zyprexa 10mg morning and 7.5mg bedtime.  - patient cleared by psychiatry, continue with home psychiatric medications      Problem/Plan - 6:  ·  Problem: History of depression.   ·  Plan: On buproprion 300mgdaily, buspirone 5mg tid, sertraline 25mg daily, zyprexa 10mg morning and 7.5mg bedtime. No suicidal thoughts or thoughts of self harm at this time. Reports mood as okay and stable  - continued buproprion 300mgdaily, buspirone 5mg tid, sertraline 25mg daily, zyprexa 10mg morning and 7.5mg bedtime.  - psychiatry consulted for medical optimization, patient cleared for discharge and instructed to continue taking home medications     Problem/Plan - 7:  ·  Problem: Hypothyroidism.   ·  Plan: On synthroid 100mcg daily. Pt noted to take cholestyramine as well  - TSH normal  - continue synthroid 100mcg daily, take only in morning without cholestyramine co-administration.     Problem/Plan - 8:  ·  Problem: AML (acute myeloid leukemia).   ·  Plan: Unclear when patient was diagnosed with AML. Per patient, it was recent and takes imatinib 400mg daily  - Obtain collateral for nursing home regarding AML diagnosis and restart imatinib as appropriate.     Problem/Plan - 9:  ·  Problem: HTN (hypertension).   ·  Plan: Mildly elevated BP at this time. No anti-HTN med on medication list from facility  - no anti-hypertensives given during admission     Problem/Plan - 9:  ·  Problem: BPH without urinary obstruction.   ·  Plan: On prazosin 2mg qhs  - given flomax 0.4mg qhs.    General: NAD  Head: Pupils equil and reactive  Neck: Supple; no JVD  Respiratory: CTAB; no wheezes/rales/rhonchi  Cardiovascular: Regular rhythm/rate; S1/S2+, no murmurs, rubs gallops   Gastrointestinal: Soft; NTND; bowel sounds normal and present  Extremities: WWP; no edema/cyanosis  Neurological: A&Ox3, CNII-XII grossly intact; no obvious focal deficits

## 2023-07-17 NOTE — DISCHARGE NOTE PROVIDER - NSDCMRMEDTOKEN_GEN_ALL_CORE_FT
buPROPion 300 mg/24 hours (XL) oral tablet, extended release: 1 orally once a day  busPIRone 5 mg oral tablet: 1 orally 3 times a day  cholestyramine 4 g/4.8 g oral powder for reconstitution: 4 orally 2 times a day  Citrucel 500 mg oral tablet: 2 orally once a day  cyanocobalamin 2500 mcg oral tablet: 1 orally once a day  divalproex sodium 500 mg oral tablet, extended release: 1 orally 2 times a day  folic acid 0.4 mg oral tablet: 1 orally once a day  hydrOXYzine hydrochloride 25 mg oral tablet: 1 orally 2 times a day  imatinib 400 mg oral tablet: 1 orally once a day  prazosin 2 mg oral capsule: 1 orally once a day (at bedtime)  sertraline 25 mg oral tablet: 1 orally once a day  Synthroid 100 mcg (0.1 mg) oral tablet: 1 orally once a day  thiamine 250 mg oral tablet: 1 orally once a day  Trelegy Ellipta 100 mcg-62.5 mcg-25 mcg/inh inhalation powder: 1 inhaled once a day  ZyPREXA 10 mg oral tablet: 1 orally once a day (in the morning)  ZyPREXA 7.5 mg oral tablet: 1 orally once a day (at bedtime)

## 2023-07-17 NOTE — BH CONSULTATION LIAISON ASSESSMENT NOTE - OTHER
Staff at [facility] other assisted living residents and staff Assisted Living Facility in [place], [place]

## 2023-07-17 NOTE — BH CONSULTATION LIAISON ASSESSMENT NOTE - SUMMARY
NAIF PENNY is a single  82y y.o. Male domiciled alone, with no children at [facility], retired teacher, PMHx of HTN, BPH, GERD, and Hypothyroidism, and AML, PPHx of Bipolar Disorder and Schizophrenia, no substance use, prior psychiatric hospitalization(s), f/u with psychiatrist at [facility], prior suicide attempt(s), unknown NSSIB, no legal history, who was admitted to hospital for Altered Mental Status. Psychiatry CL was consulted for psychiatric evaluation before discharge.  Patient was uncooperative with interview but calm, with linear thought process and good behavioral control. Patient was AAOx3. Patient is distrustful with psychiatrist and reported that people are stealing things at the assisted living facility, possible due to paranoia. However, patient denies auditory and visual hallucinations. Patient denies HI and denies symptoms of depression (poor sleep, lack of interest, guilt/hopelessness, and SI). Patient has psychiatrist that he follows up with at [facility].     Patient is psychiatrically cleared.   Continue home psychotropic medications while patient is in hospital:  - Bupropion 300mg PO daily  - Buspirone 5mg PO 3 times daily  - Sertraline 25mg PO daily  - Zyprexa 10mg PO every morning  - Zyprexa 7.5mg PO every night  Consult  for arrangements on patient's safe discharge back to Assisted Living Facility.   Continue to f/u outpatient with psychiatrist at [facility].  NAIF PENNY is a single  82y y.o. 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 Psychiatry CL was consulted for psychiatric evaluation before discharge.  On exam, pt was well groomed, calm, alert. He showed linear thought process and good behavioral control. Patient was AAOx3. Patient is distrustful with psychiatrist and reported that people are stealing things at the assisted living facility, possible due to paranoia. However, patient denies auditory and visual hallucinations. Patient denies HI and denies symptoms of depression (poor sleep, lack of interest, guilt/hopelessness, and SI). Patient has psychiatrist that he follows up with at [facility] and he endorses adherence to current psychiatric medications.    Pt's main complaint at this time is desire to transfer to an assisted living facility closer to [facility]. Discussed with pt that this may be a long and extended process, but that he should continue to work with his current treatment providers to look into this as an option. Pt aware that plan for now is for transport back to [facility]. Discussed safety precautions, including ideally leaving the premises of [facility] with accompanying staff and avoiding travel on his own.     At this time, there are no psychiatric barriers to discharge.  Continue home psychotropic medications while patient is in hospital:  - Bupropion 300mg PO daily  - Buspirone 5mg PO 3 times daily  - Sertraline 25mg PO daily  - Zyprexa 10mg PO every morning  - Zyprexa 7.5mg PO every night  Consult SW for arrangements on patient's safe discharge back to Assisted Living Facility.   Continue to f/u outpatient with psychiatrist at [facility].  NAIF PENNY is a single  82y y.o. 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 Psychiatry CL was consulted for psychiatric evaluation before discharge.  On exam, pt was well groomed, calm, alert. He showed linear thought process and good behavioral control. Patient was AAOx3. Patient is distrustful with psychiatrist and reported that people are stealing things at the assisted living facility, possible due to paranoia. However, patient denies auditory and visual hallucinations. Patient denies HI and denies symptoms of depression (poor sleep, lack of interest, guilt/hopelessness, and SI). Patient has psychiatrist that he follows up with at [facility] and he endorses adherence to current psychiatric medications.    Pt's main complaint at this time is desire to transfer to an assisted living facility closer to [facility]. Discussed with pt that this may be a long and extended process, but that he should continue to work with his current treatment providers to look into this as an option. Pt aware that plan for now is for transport back to [facility]. Discussed safety precautions, including ideally leaving the premises of [facility] with accompanying staff and avoiding travel on his own.     At this time, there are no psychiatric barriers to discharge.  Continue home psychotropic medications while patient is in hospital:  - Bupropion 300mg PO daily  - Buspirone 5mg PO 3 times daily (can make PRN anxiety to reduce pill burden)  - Sertraline 25mg PO daily  - Zyprexa 10mg PO every morning  - Zyprexa 7.5mg PO every night  - Depakote 500mg BID    Recommend checking daily Qtc while in hospital.  Consult SW for arrangements on patient's safe discharge back to Assisted Living Facility.   Continue to f/u outpatient with psychiatrist at [facility].

## 2023-07-17 NOTE — BH CONSULTATION LIAISON ASSESSMENT NOTE - RISK ASSESSMENT
Modifiable risk factors include psychiatric illness, medical illness, lack of social support/living alone/single.  Static risk factors include advanced age, male gender,  race, prior SA.  Protective factors include Mu-ism.   Pt has multiple chronic risk factors increasing his risk of harm to self or others. These include psychiatric illness, medical illness, single, history of psychiatric hospitalization and suicide attempt, advanced age, male gender,  race.  His protective factors include his being stably domiciled at a residential facility with staff who know him well, as well as denial of any SI for past 50 years.  Pt's acute risk for harm to self or others is not currently elevated.

## 2023-07-17 NOTE — BH CONSULTATION LIAISON ASSESSMENT NOTE - HPI (INCLUDE ILLNESS QUALITY, SEVERITY, DURATION, TIMING, CONTEXT, MODIFYING FACTORS, ASSOCIATED SIGNS AND SYMPTOMS)
NAIF PENNY is a single  82y y.o. 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 Psychiatry CL was consulted for psychiatric evaluation before discharge.  On approach, patient was calm and sitting in bed but mostly uncooperative with interview. Patient reports that people in his assisted facility residence are stealing things. He reports that here is nothing to do there and that he would like to work in [place], but no one is helping him to do so. He reports that he wants to go to plays and concerts but that they only have bingo at the assisted living facility. He reports that he worked in [place] at some point in his life and would like to be transferred to a facility in the [facility]. Patient reports that psychiatrists are the reason he lost his job many years ago and states, "Nothing you people say is confidential." Patient reports that he gets 7 hours of sleep at night, has interest in going to concerts and plays and to work, and denies any feelings of guilt or hopelessness. Patient denies SI, HI, and A/V Hallucinations.     Staff from [facility] reported that this is the first time the patient has left the facility without signing out. She reports that the residents take weekly trips into the community organized by the facility and that residents are allowed to leave the facility to do activities on their own in the community as long as they sign out. She reports that patient lived in [place] prior to living in another Assisted Living facility [facility] for 4-5 years before living at [place] for 6 years. She reports that she is not aware of him ever living in [place].  NAIF PENNY is a single  82y y.o. 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 Psychiatry CL was consulted for psychiatric evaluation before discharge, as requested by his assisted living facility.    On approach, patient was calm and sitting in bed but often refusing to answer questions during interview. Patient reports that people in his assisted facility residence are stealing things. He reports that here is nothing to do there and that he would like to work in [place], but no one is helping him to do so. He reports that he wants to go to plays and concerts but that they only have bingo at the [facility] living facility. He reports that he worked in [place] at some point in his life and would like to be transferred to a facility in the [facility]. Patient reports that psychiatrists are the reason he lost his job many years ago and states, "Nothing you people say is confidential." Patient reports that he gets 7 hours of sleep at night, has interest in going to concerts and plays and to work, and denies any feelings of guilt or hopelessness. Patient denies SI, HI, and A/V Hallucinations.     Regarding his past psychiatric hx, he reports dx of schizophrenia, bipolar, MDD and endorses being on stable set of psychiatric meds as prescribed by psychiatrist at his assisted living facility, but doesn't remember her name. He endorses last being hospitalized "many many years ago". He endorses a suicide attempt "in ancient times" which he clarified took place about 50 years ago, but he does not provide further detail.     Regarding his current presentation, he says he left ...    Staff from [facility] reported that this is the first time the patient has left the facility without signing out. She reports that the residents take weekly trips into the community organized by the facility and that residents are allowed to leave the facility to do activities on their own in the community as long as they sign out. She reports that patient lived in [place] prior to living in another Assisted Living facility [facility] for 4-5 years before living at [place] for 6 years. She reports that she is not aware of him ever living in [place].  NAIF PENNY is a single  82y y.o. 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 Psychiatry CL was consulted for psychiatric evaluation before discharge, as requested by his assisted living facility.    On approach, patient was calm and sitting in bed but often refusing to answer questions during interview. Patient reports that people in his assisted facility residence are stealing things. He reports that here is nothing to do there and that he would like to work in [place], but no one is helping him to do so. He reports that he wants to go to plays and concerts but that they only have bingo at the assisted living facility. He reports that he worked in [place] at some point in his life and would like to be transferred to a facility in the [facility]. Patient reports that psychiatrists are the reason he lost his job many years ago and states, "Nothing you people say is confidential." Patient reports that he gets 7 hours of sleep at night, has interest in going to concerts and plays and to work, and denies any feelings of guilt or hopelessness. Patient denies SI, HI, and A/V Hallucinations.     Regarding his past psychiatric hx, he reports dx of schizophrenia, bipolar, MDD and endorses being on stable set of psychiatric meds as prescribed by psychiatrist at his assisted living facility, but doesn't remember her name. He endorses last being hospitalized "many many years ago". He endorses a suicide attempt "in ancient times" which he clarified took place about 50 years ago, but he does not provide further detail.     Regarding his current presentation, he says he left his facility on Friday or Saturday and since then has been staying at a hotel. He states he got lost/confused on Sunday, and that's when he was taken to the hospital. Currently, he is A&O x4 and alert. Pt's psychiatric meds (wellbutrin, zyprexa, depakote, sertraline) were confirmed with facility and restarted in hospital, per primary team.    Staff from assisted living facility reported that this is the first time the patient has left the facility without signing out. She reports that the residents take weekly trips into the community organized by the facility and that residents are allowed to leave the facility to do activities on their own in the community as long as they sign out. She reports that patient lived in [place] prior to living in another Assisted Living facility [facility] for 4-5 years before living at [facility] for 6 years. She reports that she is not aware of him ever living in [place]. She reports that she has noticed pt has appeared to be "staring into space" and not making as much sense over the past week. She desires psychiatric evaluation before pt returns to facility. Confirms his last psychiatric hospitalization was in the remote past.    Per yovanny, pt's last ER visit for MH reason was in June 2021. Does not list any psychiatric hospitalization within the past 5 years.

## 2023-07-17 NOTE — BH CONSULTATION LIAISON ASSESSMENT NOTE - NSBHCONSULTFOLLOWAFTERCARE_PSY_A_CORE FT
See above - continue follow up with psychiatrist at [facility].  Recommend coordinating transport for pt back to [facility] in [place], [place].

## 2023-07-17 NOTE — BH CONSULTATION LIAISON ASSESSMENT NOTE - NSBHCHARTREVIEWVS_PSY_A_CORE FT
Vital Signs Last 24 Hrs  T(C): 36.3 (17 Jul 2023 05:50), Max: 36.7 (16 Jul 2023 18:04)  T(F): 97.4 (17 Jul 2023 05:50), Max: 98.1 (16 Jul 2023 18:04)  HR: 58 (17 Jul 2023 05:50) (58 - 68)  BP: 142/73 (17 Jul 2023 05:50) (142/73 - 157/92)  BP(mean): --  RR: 17 (17 Jul 2023 05:50) (17 - 20)  SpO2: 92% (17 Jul 2023 05:50) (92% - 97%)    Parameters below as of 17 Jul 2023 00:54  Patient On (Oxygen Delivery Method): nasal cannula  O2 Flow (L/min): 2

## 2023-07-17 NOTE — DISCHARGE NOTE PROVIDER - NSDCFUADDAPPT_GEN_ALL_CORE_FT
Continue to f/u outpatient with psychiatrist at [facility]. Continue to f/u outpatient with PCP and psychiatrist at [facility].  ****please follow up chest ct outpatient for lung nodule on CXR

## 2023-07-17 NOTE — CONSULT NOTE ADULT - SUBJECTIVE AND OBJECTIVE BOX
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 Patient is from [facility] and per pt he left yesterday to come to [facility] to explore the [place] and wanted to "look for a job" as he does not want to be retired. Patient is alert and oriented x3 and answering questions appropriately. Does not like taking his antipsychotics and sometimes has poor compliance with them, reportedly has not taken them for last ~3 days. Recently diagnosed with AML and started on imatinib. No recent illness. Does endorse some dyspnea on exertion today but is comfortable in bed. Denies headache, vision change, dizziness, fever, chills, chest pain, abd pain, dysuria.    In the ED:    - VS: Tmax: 98.2, HR: 67, BP: 148/83, RR: 17, O2: 95% 3L NC    - Pertinent Labs: UA trace LE    - Imaging: CTH: AGe-appropriate involutional changes. Age indeterminant right basal gangilal lacunar infarct. No acute intracranial hemorrhage.  CXR: mild blunting of costophrenic angle, possible bilateral small pleural effusion  EKG:     - Treatment/interventions: bupropion 300mg x1, depakote 500mg x1, zyprexa 10mg x1, 1L NS, albuterol x1    PMHx: COPD (home 2L), schizophrenia, bipolar disorder, depression, HTN, BPH, GERD, hypothyroidism, recently diagnosed AML   PSHx: see below  Meds: See med rec  Allergies: NKDA  Social: see below   (16 Jul 2023 23:43)    PAST MEDICAL & SURGICAL HISTORY:  Chronic obstructive pulmonary disease (COPD)      Schizophrenia      Bipolar disorder      History of depression      HTN (hypertension)      BPH without urinary obstruction      GERD (gastroesophageal reflux disease)      Hypothyroidism      AML (acute myeloid leukemia)      History of colon resection      History of hip surgery        MEDICATIONS  (STANDING):  budesonide 160 MICROgram(s)/formoterol 4.5 MICROgram(s) Inhaler 2 Puff(s) Inhalation two times a day  buPROPion XL (24-Hour) . 300 milliGRAM(s) Oral daily  busPIRone 5 milliGRAM(s) Oral three times a day  divalproex  milliGRAM(s) Oral <User Schedule>  enoxaparin Injectable 40 milliGRAM(s) SubCutaneous every 24 hours  folic acid 1 milliGRAM(s) Oral daily  levothyroxine 100 MICROGram(s) Oral daily  OLANZapine 7.5 milliGRAM(s) Oral at bedtime  OLANZapine 10 milliGRAM(s) Oral <User Schedule>  sertraline 25 milliGRAM(s) Oral daily  tamsulosin 0.4 milliGRAM(s) Oral at bedtime  tiotropium 2.5 MICROgram(s) Inhaler 2 Puff(s) Inhalation daily    MEDICATIONS  (PRN):             Home Living Status :  lives at [facility]      Baseline Functional Level Prior to Admission :             - ADL's/ IADL's :  independent          - Ambulatory status prior to admission :   walked with no DME         FAMILY HISTORY:  FHx: diabetes mellitus (Father, Mother)        CBC Full  -  ( 17 Jul 2023 05:30 )  WBC Count : 7.78 K/uL  RBC Count : 3.96 M/uL  Hemoglobin : 13.0 g/dL  Hematocrit : 39.0 %  Platelet Count - Automated : 219 K/uL  Mean Cell Volume : 98.5 fl  Mean Cell Hemoglobin : 32.8 pg  Mean Cell Hemoglobin Concentration : 33.3 gm/dL  Auto Neutrophil # : x  Auto Lymphocyte # : x  Auto Monocyte # : x  Auto Eosinophil # : x  Auto Basophil # : x  Auto Neutrophil % : x  Auto Lymphocyte % : x  Auto Monocyte % : x  Auto Eosinophil % : x  Auto Basophil % : x      07-17    136  |  105  |  6<L>  ----------------------------<  86  4.0   |  27  |  0.80    Ca    8.4      17 Jul 2023 05:30  Phos  2.3     07-17  Mg     1.8     07-17    TPro  7.8  /  Alb  3.9  /  TBili  0.7  /  DBili  x   /  AST  30  /  ALT  24  /  AlkPhos  81  07-16      Urinalysis Basic - ( 17 Jul 2023 05:30 )    Color: x / Appearance: x / SG: x / pH: x  Gluc: 86 mg/dL / Ketone: x  / Bili: x / Urobili: x   Blood: x / Protein: x / Nitrite: x   Leuk Esterase: x / RBC: x / WBC x   Sq Epi: x / Non Sq Epi: x / Bacteria: x        Radiology :     < from: Xray Chest 2 Views PA/Lat (07.16.23 @ 14:03) >  ACC: [id number] EXAM:  XR CHEST PA LAT 2V   ORDERED BY: VENKATA HUDSON     *** ADDENDUM # 1 ***    Addendum:  I conveyed the finding of the left lower lobe 1.9 cm nodular density and   the need for a CT to Dr. Stu Win at 10/19 and 7/17/2023.    --- End of Report ---    *** END OF ADDENDUM # 1 ***      PROCEDURE DATE:  07/16/2023          INTERPRETATION:  Clinical history reason for exam: Shortness of breath.    PA and lateral.    No comparison.    Findings/  impression: Hyperinflation. Left basilar 1.9 cm nodule, peripheral   Fleischner line. CT chest recommended Bilateral bullous emphysema,,   bibasilar opacities/focal atelectasis, left upper lobe granulomas. Heart   and mediastinum are unremarkable. Thoracic spine and bilateral shoulder   degenerative changes. Upper abdominal surgical clips.      < from: CT Head No Cont (07.16.23 @ 14:03) >  ACC: [id number] EXAM:  CT BRAIN   ORDERED BY: VENKATA HUDSON     PROCEDURE DATE:  07/16/2023          INTERPRETATION:  CT BRAIN WITHOUT CONTRAST    INDICATIONS:  Altered mental status    TECHNIQUE:  Serial axial images were obtained from the skull baseto the   vertex without the use of contrast.    COMPARISON EXAM: None.    FINDINGS:  Ventricles and sulci: Parenchymal volume loss is present which is   commensurate with patient age.  Intra-axial: Periventricular small vessel white matter ischemic changes   are appreciated. Age-indeterminate right-sided lacunar infarct at the   junction of the external capsule and anterior limb of the internal   capsule (2-18). No intracranial mass, acute hemorrhage, or midline shift   is present.  Extra-axial: No extra-axial fluid collection is identified. Mild   deposition of calcified plaques in association with the distal   intracranial left vertebral artery and bilateral carotid siphons.  Calvarium: Intact.    Appearance of the bilateral optic lenses suggests the patient has   previously undergone prior cataract surgery. Bilateral optic globes are   intact. Imaged paranasal sinuses, bilateral mastoid air cells, middle ear   cavities are clear.    IMPRESSION: Age-appropriate involutional changes. Ageindeterminant right   basal ganglial lacunar infarct. No large arterial distribution acute   infarct. No acute intracranial hemorrhage. If altered mental status   persists, consider further evaluation via MR imaging to include DWI and   ADC mapping techniques, provided there are no contraindications.       Review of Systems : per HPI               Vital Signs Last 24 Hrs  T(C): 36.3 (17 Jul 2023 05:50), Max: 36.7 (16 Jul 2023 13:13)  T(F): 97.4 (17 Jul 2023 05:50), Max: 98.1 (16 Jul 2023 18:04)  HR: 58 (17 Jul 2023 05:50) (58 - 68)  BP: 142/73 (17 Jul 2023 05:50) (142/73 - 161/91)  BP(mean): --  RR: 17 (17 Jul 2023 05:50) (17 - 20)  SpO2: 92% (17 Jul 2023 05:50) (92% - 99%)    Parameters below as of 17 Jul 2023 00:54  Patient On (Oxygen Delivery Method): nasal cannula  O2 Flow (L/min): 2          Physical Exam :   81 yo man  lying comfortably in semi Stack's position , awake , alert , no acute complaints      Head : normocephalic , atraumatic    Eyes : PERRLA , EOMI , no nystagmus , sclera anicteric    ENT / FACE : neg nasal discharge , uvula midline , no oropharyngeal erythema / exudate    Neck : supple , negative JVD , negative carotid bruits , no thyromegaly    Chest : CTA bilaterally , neg wheeze / rhonchi / rales / crackles / egophany    Cardiovascular : regular rate and rhythm , neg murmurs / rubs / gallops    Abdomen : soft , non distended , non tender to palpation in all 4 quadrants ,  normal bowel sounds     Extremities : WWP , neg cyanosis /clubbing / edema     Neurologic Exam :    Alert and oriented to person , place , date/year , speech fluent w/o dysarthria     Motor Exam:          Right UE:               no focal weakness ,  > 3+/5 throughout  , no drift     Left UE:                 no focal weakness ,  > 3+/5 throughout  , no drift         Right LE:    no focal weakness ,  > 3+/5 throughout        Left LE:    no focal weakness ,  > 3+/5 throughout         Sensation :         intact to light touch x 4 extremities                            no neglect or extinction on double simultaneous testing      DTR :     biceps/brachioradialis : equal                      patella/ankle : equal     neg Babinski       Coordination :      Finger to Nose :  neg dysmetria bilaterally       Gait :  not tested          PM&R Impression :     admitted for AMS     - no acute pathology on CT brain imaging , age indeterminant right basal luncar infarct     - no focal weakness           Recommendations / Plan :                1) Physical / Occupational therapy focusing on therapeutic exercises , equipment evaluation , bed mobility/transfer out of bed evaluation , progressive ambulation with assistive devices prn .    2) Current disposition plan recommendation  :    pending functional progress

## 2023-07-17 NOTE — PATIENT PROFILE ADULT - FALL HARM RISK - HARM RISK INTERVENTIONS
Assistance with ambulation/Assistance OOB with selected safe patient handling equipment/Communicate Risk of Fall with Harm to all staff/Discuss with provider need for PT consult/Monitor for mental status changes/Monitor gait and stability/Provide patient with walking aids - walker, cane, crutches/Reinforce activity limits and safety measures with patient and family/Reorient to person, place and time as needed/Sit up slowly, dangle for a short time, stand at bedside before walking/Tailored Fall Risk Interventions/Toileting schedule using arm’s reach rule for commode and bathroom/Use of alarms - bed, chair and/or voice tab/Visual Cue: Yellow wristband and red socks/Bed in lowest position, wheels locked, appropriate side rails in place/Call bell, personal items and telephone in reach/Instruct patient to call for assistance before getting out of bed or chair/Non-slip footwear when patient is out of bed/[place] to call system/Physically safe environment - no spills, clutter or unnecessary equipment/Purposeful Proactive Rounding/Room/bathroom lighting operational, light cord in reach

## 2023-07-18 PROCEDURE — 99232 SBSQ HOSP IP/OBS MODERATE 35: CPT | Mod: GC

## 2023-07-18 PROCEDURE — 99232 SBSQ HOSP IP/OBS MODERATE 35: CPT

## 2023-07-18 RX ORDER — DIVALPROEX SODIUM 500 MG/1
500 TABLET, DELAYED RELEASE ORAL
Refills: 0 | Status: DISCONTINUED | OUTPATIENT
Start: 2023-07-18 | End: 2023-07-19

## 2023-07-18 RX ORDER — DIVALPROEX SODIUM 500 MG/1
500 TABLET, DELAYED RELEASE ORAL AT BEDTIME
Refills: 0 | Status: DISCONTINUED | OUTPATIENT
Start: 2023-07-18 | End: 2023-07-19

## 2023-07-18 RX ADMIN — BUPROPION HYDROCHLORIDE 300 MILLIGRAM(S): 150 TABLET, EXTENDED RELEASE ORAL at 13:53

## 2023-07-18 RX ADMIN — Medication 5 MILLIGRAM(S): at 22:24

## 2023-07-18 RX ADMIN — Medication 5 MILLIGRAM(S): at 05:42

## 2023-07-18 RX ADMIN — SERTRALINE 25 MILLIGRAM(S): 25 TABLET, FILM COATED ORAL at 13:53

## 2023-07-18 RX ADMIN — Medication 5 MILLIGRAM(S): at 13:54

## 2023-07-18 RX ADMIN — DIVALPROEX SODIUM 500 MILLIGRAM(S): 500 TABLET, DELAYED RELEASE ORAL at 10:15

## 2023-07-18 RX ADMIN — BUDESONIDE AND FORMOTEROL FUMARATE DIHYDRATE 2 PUFF(S): 160; 4.5 AEROSOL RESPIRATORY (INHALATION) at 05:43

## 2023-07-18 RX ADMIN — BUDESONIDE AND FORMOTEROL FUMARATE DIHYDRATE 2 PUFF(S): 160; 4.5 AEROSOL RESPIRATORY (INHALATION) at 17:28

## 2023-07-18 RX ADMIN — OLANZAPINE 10 MILLIGRAM(S): 15 TABLET, FILM COATED ORAL at 10:18

## 2023-07-18 RX ADMIN — DIVALPROEX SODIUM 500 MILLIGRAM(S): 500 TABLET, DELAYED RELEASE ORAL at 22:25

## 2023-07-18 RX ADMIN — TIOTROPIUM BROMIDE 2 PUFF(S): 18 CAPSULE ORAL; RESPIRATORY (INHALATION) at 12:42

## 2023-07-18 RX ADMIN — Medication 1 MILLIGRAM(S): at 13:54

## 2023-07-18 RX ADMIN — ENOXAPARIN SODIUM 40 MILLIGRAM(S): 100 INJECTION SUBCUTANEOUS at 05:45

## 2023-07-18 RX ADMIN — TAMSULOSIN HYDROCHLORIDE 0.4 MILLIGRAM(S): 0.4 CAPSULE ORAL at 22:22

## 2023-07-18 RX ADMIN — Medication 100 MICROGRAM(S): at 05:42

## 2023-07-18 RX ADMIN — OLANZAPINE 7.5 MILLIGRAM(S): 15 TABLET, FILM COATED ORAL at 22:24

## 2023-07-18 NOTE — PROGRESS NOTE ADULT - PROBLEM SELECTOR PLAN 5
On buproprion 300mgdaily, buspirone 5mg tid, sertraline 25mg daily, zyprexa 10mg morning and 7.5mg bedtime. hx of self harm (cutting wrists) ~5 years ago. No suicidal thoughts or thoughts of self harm at this time. Reports mood as okay, however states that he is very sad at his current living facility.  - continue buproprion 300mgdaily, buspirone 5mg tid, sertraline 25mg daily, zyprexa 10mg morning and 7.5mg bedtime
On buproprion 300mgdaily, buspirone 5mg tid, sertraline 25mg daily, zyprexa 10mg morning and 7.5mg bedtime. hx of self harm (cutting wrists) ~5 years ago. No suicidal thoughts or thoughts of self harm at this time. Reports mood as okay, however states that he is very sad at his current living facility.  - f/u psych recs for medical optimization.  - continue buproprion 300mgdaily, buspirone 5mg tid, sertraline 25mg daily, zyprexa 10mg morning and 7.5mg bedtime

## 2023-07-18 NOTE — PROGRESS NOTE ADULT - PROBLEM SELECTOR PLAN 3
ABDOMINAL PAIN/NAUSEA
On divalproic 500mg bid, zyprexa 10mg morning and 7.5mg bedtime,     -  continue divalproic 500mg bid, zyprexa 10mg morning and 7.5mg bedtime
On divalproic 500mg bid, zyprexa 10mg morning and 7.5mg bedtime,     -  continue divalproic 500mg bid, zyprexa 10mg morning and 7.5mg bedtime  - f/u psych recs

## 2023-07-18 NOTE — PROGRESS NOTE ADULT - PROBLEM SELECTOR PROBLEM 9
Purple DH (Discharge Huddle; Vulnerable Patient)
BPH without urinary obstruction
BPH without urinary obstruction

## 2023-07-18 NOTE — PROGRESS NOTE ADULT - PROBLEM SELECTOR PLAN 1
BIBEMS after being found in hotel lobby confused. Pt is from [place] assisted living [facility], supervisor is Kiera Wilhelm and was last seen at facility yesterday at 3:30PM. At time of examination, pt is AAOx3 however admits to being forgetful at times during past year. Pt reports being non-compliant with antipsychotic meds and as a result may have inadequate treatment of his underlying psychological disorders including schizophrenia and bipolar. CT head without acute pathology (has age indeterminant right basal luncar infarct)    - Monitor mental status  - TSH, b12 wnl  - continue psychiatric medications
BIBEMS after being found in hotel lobby confused. Pt is from [place] assisted living [facility], supervisor is Kiera Wilhelm and was last seen at facility yesterday at 3:30PM. At time of examination, pt is AAOx3 however admits to being forgetful at times during past year. Pt reports being non-compliant with antipsychotic meds and as a result may have inadequate treatment of his underlying psychological disorders including schizophrenia and bipolar. CT head without acute pathology (has age indeterminant right basal luncar infarct)    - Monitor mental status  - TSH, b12 wnl  - continue psychiatric medications

## 2023-07-18 NOTE — PROGRESS NOTE ADULT - PROBLEM/PLAN-9
DISPLAY PLAN FREE TEXT
DISPLAY PLAN FREE TEXT
Show Additional Anesthesia Variables In The Stage Tabs: Yes

## 2023-07-18 NOTE — BH CONSULTATION LIAISON PROGRESS NOTE - NSBHCHARTREVIEWVS_PSY_A_CORE FT
Vital Signs Last 24 Hrs  T(C): 36.3 (18 Jul 2023 08:49), Max: 36.3 (17 Jul 2023 21:00)  T(F): 97.4 (18 Jul 2023 08:49), Max: 97.4 (18 Jul 2023 05:56)  HR: 70 (18 Jul 2023 08:49) (52 - 75)  BP: 157/88 (18 Jul 2023 08:49) (138/70 - 166/84)  BP(mean): --  RR: 17 (18 Jul 2023 08:49) (16 - 18)  SpO2: 95% (18 Jul 2023 08:49) (95% - 98%)    Parameters below as of 18 Jul 2023 08:49  Patient On (Oxygen Delivery Method): nasal cannula  O2 Flow (L/min): 2

## 2023-07-18 NOTE — PROGRESS NOTE ADULT - PROBLEM SELECTOR PLAN 10
Fluids: None  Electrolytes: Replete K<4 and Mg<2  Nutrition: Regular diet  DVT ppx: lovenox  Code: Full code  Dispo: [facility]
Fluids: None  Electrolytes: Replete K<4 and Mg<2  Nutrition: Regular diet  DVT ppx: lovenox  Code: Full code  Dispo: [facility]

## 2023-07-18 NOTE — BH CONSULTATION LIAISON PROGRESS NOTE - NSBHFUPINTERVALCCFT_PSY_A_CORE
Chart reviewed. Per nursing notes, overnight pt attempted to walk out exit door, but was walked back to bed without issue. Pt taking psychiatric medication as scheduled.  Pt assessed at bedside this afternoon. He appeared lying in bed in NAD. Reports mood as "disappointed". Explains that he feels his life in [place] is "boring" and he would prefer to be at an assisted living facility in [facility]. Discussed with pt the dangers of eloping from his facility, including his lack of oxygen which he needs at home. Discussed plan for transport back to his former facility, where he could work with support staff in attempting to secure a residence elsewhere, if desired. Pt describes he has been particularly disappointed by the decreased in activities at his residence following covid -- describes he used to enjoy going outside for picnics and taking trips to [place] with his residence.

## 2023-07-18 NOTE — BH CONSULTATION LIAISON PROGRESS NOTE - NSBHASSESSMENTFT_PSY_ALL_CORE
NAIF PENNY is a single  82y y.o. Male domiciled alone, [facility], retired teacher, PMHx of HTN, BPH, GERD, and Hypothyroidism, and AML, PPHx of Bipolar Disorder and Schizophrenia, no substance use, prior psychiatric hospitalization(s), f/u with psychiatrist at [facility], prior suicide attempt(s), unknown NSSIB, no legal history, who was admitted to hospital for Altered Mental Status. Psychiatry CL was consulted for psychiatric evaluation before discharge.  On exam, pt was well groomed, calm, alert. He showed linear thought process and good behavioral control. Patient was AAOx3. Patient is distrustful with psychiatrist and reported that people are stealing things at the assisted living facility, possible due to paranoia. However, patient denies auditory and visual hallucinations. Patient denies HI and denies symptoms of depression (poor sleep, lack of interest, guilt/hopelessness, and SI). Patient has psychiatrist that he follows up with at Assisted Living Facility and he endorses adherence to current psychiatric medications.  Pt's main complaint at this time is desire to transfer to an assisted living facility closer to [facility]. Discussed with pt that this may be a long and extended process, but that he should continue to work with his current treatment providers to look into this as an option. Pt aware that plan for now is for transport back to [facility]. Discussed safety precautions, including ideally leaving the premises of [place] with accompanying staff and avoiding travel on his own.     RECOMMENDATIONS:  At this time, there are no psychiatric barriers to discharge.  Continue home psychotropic medications while patient is in hospital:  - Bupropion 300mg PO daily  - Buspirone 5mg PO 3 times daily (can make PRN anxiety to reduce pill burden)  - Sertraline 25mg PO daily  - Zyprexa 10mg PO every morning  - Zyprexa 7.5mg PO every night  - Depakote 500mg BID    Recommend checking daily Qtc while in hospital.  Consult SW for arrangements on patient's safe discharge back to Assisted Living Facility.

## 2023-07-18 NOTE — PROGRESS NOTE ADULT - ATTENDING COMMENTS
Patient was seen and examined at bedside on 7/18/2023 at 1230 pm. Patient reports that he feels well. Denies CP, SOB. ROS is otherwise negative. Vitals, labwork and pertinent imaging reviewed. Physical exam - NAD, AAO x 4, PERRLA, EOMI, supple neck, chest - CTA b/l, CV - rrr, s1s2, no m/r/g, abd - soft, + BS, ext - wwp, psych - normal affect, skin - no rash, back - midline    Plan  -Patient is medically ready for d/c  -Needs to have nodule seen on CXR followed up as an outpatient
Patient was seen and examined at bedside on 7/17/2023 at 330 pm. Patient reports that he feels well. Denies CP, SOB. ROS is otherwise negative. Vitals, labwork and pertinent imaging reviewed. Physical exam - NAD, AAO x 4, PERRLA, EOMI, supple neck, chest - CTA b/l, CV - rrr, s1s2, no m/r/g, abd - soft, + BS, ext - wwp, psych - normal affect, skin - no rash, back - midline    Plan  -Patient is medically ready for d/c  -Needs to have nodule seen on CXR followed up as an outpatient

## 2023-07-18 NOTE — PROGRESS NOTE ADULT - SUBJECTIVE AND OBJECTIVE BOX
O/N Events: No events overnight.    Subjective/ROS: Patient seen and examined at bedside. Resting comfortably, mentating well, and saturating well on NC. No complaints at this time.    VITALS  Vital Signs Last 24 Hrs  T(C): 36.3 (17 Jul 2023 05:50), Max: 36.7 (16 Jul 2023 13:13)  T(F): 97.4 (17 Jul 2023 05:50), Max: 98.1 (16 Jul 2023 18:04)  HR: 58 (17 Jul 2023 05:50) (58 - 68)  BP: 142/73 (17 Jul 2023 05:50) (142/73 - 161/91)  BP(mean): --  RR: 17 (17 Jul 2023 05:50) (17 - 20)  SpO2: 92% (17 Jul 2023 05:50) (92% - 99%)    Parameters below as of 17 Jul 2023 00:54  Patient On (Oxygen Delivery Method): nasal cannula  O2 Flow (L/min): 2    PHYSICAL EXAM  General: NAD  Head: Pupils equil and reactive  Neck: Supple; no JVD  Respiratory: CTAB; no wheezes/rales/rhonchi  Cardiovascular: Regular rhythm/rate; S1/S2+, no murmurs, rubs gallops   Gastrointestinal: Soft; NTND; bowel sounds normal and present  Extremities: WWP; no edema/cyanosis  Neurological: A&Ox3, CNII-XII grossly intact; no obvious focal deficits    MEDICATIONS  (STANDING):  budesonide 160 MICROgram(s)/formoterol 4.5 MICROgram(s) Inhaler 2 Puff(s) Inhalation two times a day  buPROPion XL (24-Hour) . 300 milliGRAM(s) Oral daily  busPIRone 5 milliGRAM(s) Oral three times a day  divalproex  milliGRAM(s) Oral <User Schedule>  enoxaparin Injectable 40 milliGRAM(s) SubCutaneous every 24 hours  folic acid 1 milliGRAM(s) Oral daily  levothyroxine 100 MICROGram(s) Oral daily  OLANZapine 7.5 milliGRAM(s) Oral at bedtime  OLANZapine 10 milliGRAM(s) Oral <User Schedule>  sertraline 25 milliGRAM(s) Oral daily  tamsulosin 0.4 milliGRAM(s) Oral at bedtime  tiotropium 2.5 MICROgram(s) Inhaler 2 Puff(s) Inhalation daily    MEDICATIONS  (PRN):      No Known Allergies      LABS                        13.0   7.78  )-----------( 219      ( 17 Jul 2023 05:30 )             39.0     07-17    136  |  105  |  6<L>  ----------------------------<  86  4.0   |  27  |  0.80    Ca    8.4      17 Jul 2023 05:30  Phos  2.3     07-17  Mg     1.8     07-17    TPro  7.8  /  Alb  3.9  /  TBili  0.7  /  DBili  x   /  AST  30  /  ALT  24  /  AlkPhos  81  07-16      Urinalysis Basic - ( 17 Jul 2023 05:30 )    Color: x / Appearance: x / SG: x / pH: x  Gluc: 86 mg/dL / Ketone: x  / Bili: x / Urobili: x   Blood: x / Protein: x / Nitrite: x   Leuk Esterase: x / RBC: x / WBC x   Sq Epi: x / Non Sq Epi: x / Bacteria: x              IMAGING/EKG/ETC  
O/N Events: ENOCH    Subjective/ROS: Patient seen and examined at bedside. States that he does not want to return to [facility] living [facility] and would like to be in the city. Otherwise no complaints.    VITALS  Vital Signs Last 24 Hrs  T(C): 36.3 (18 Jul 2023 08:49), Max: 36.3 (17 Jul 2023 21:00)  T(F): 97.4 (18 Jul 2023 08:49), Max: 97.4 (18 Jul 2023 05:56)  HR: 70 (18 Jul 2023 08:49) (52 - 75)  BP: 157/88 (18 Jul 2023 08:49) (138/70 - 166/84)  BP(mean): --  RR: 17 (18 Jul 2023 08:49) (16 - 18)  SpO2: 95% (18 Jul 2023 08:49) (95% - 98%)    Parameters below as of 18 Jul 2023 08:49  Patient On (Oxygen Delivery Method): nasal cannula  O2 Flow (L/min): 2    PHYSICAL EXAM  General: NAD  Head: atraumatic normocephalic  Neck: Supple; no JVD  Respiratory: CTAB; no wheezes/rales/rhonchi  Cardiovascular: Regular rhythm/rate; S1/S2+, no murmurs, rubs gallops   Gastrointestinal: Soft; NTND; bowel sounds normal and present  Extremities: WWP; no edema/cyanosis  Neurological: A&Ox3    MEDICATIONS  (STANDING):  budesonide 160 MICROgram(s)/formoterol 4.5 MICROgram(s) Inhaler 2 Puff(s) Inhalation two times a day  buPROPion XL (24-Hour) . 300 milliGRAM(s) Oral daily  busPIRone 5 milliGRAM(s) Oral three times a day  divalproex  milliGRAM(s) Oral <User Schedule>  enoxaparin Injectable 40 milliGRAM(s) SubCutaneous every 24 hours  folic acid 1 milliGRAM(s) Oral daily  levothyroxine 100 MICROGram(s) Oral daily  OLANZapine 10 milliGRAM(s) Oral <User Schedule>  OLANZapine 7.5 milliGRAM(s) Oral at bedtime  sertraline 25 milliGRAM(s) Oral daily  tamsulosin 0.4 milliGRAM(s) Oral at bedtime  tiotropium 2.5 MICROgram(s) Inhaler 2 Puff(s) Inhalation daily    MEDICATIONS  (PRN):      No Known Allergies      LABS                        13.0   7.78  )-----------( 219      ( 17 Jul 2023 05:30 )             39.0     07-17    136  |  105  |  6<L>  ----------------------------<  86  4.0   |  27  |  0.80    Ca    8.4      17 Jul 2023 05:30  Phos  2.3     07-17  Mg     1.8     07-17

## 2023-07-18 NOTE — BH CONSULTATION LIAISON PROGRESS NOTE - NSBHCONSULTRECOMMENDOTHER_PSY_A_CORE FT
Elevated risk of harm that had been elevated on admission has been effectively mitigated by medical observation and restarting on medications and home O2. Pt has now returned to his chronic underlying risk status and his acute risk for harm to self or others is not elevated. Education was provided to pt regarding staying safe and not eloping from residence, especially in light of his chronic medical problems; pt endorses understanding and is in agreement with transport back to assisted living in [place], [place].

## 2023-07-18 NOTE — PROGRESS NOTE ADULT - PROBLEM SELECTOR PLAN 4
On divalproic 500mg bid, zyprexa 10mg morning and 7.5mg bedtime    - continue divalproic 500mg bid, zyprexa 10mg morning and 7.5mg bedtime
On divalproic 500mg bid, zyprexa 10mg morning and 7.5mg bedtime    - continue divalproic 500mg bid, zyprexa 10mg morning and 7.5mg bedtime  - f/u psych recs

## 2023-07-18 NOTE — PROGRESS NOTE ADULT - REASON FOR ADMISSION
altered mental status
altered mental status
Simple: Patient demonstrates quick and easy understanding

## 2023-07-18 NOTE — BH CONSULTATION LIAISON PROGRESS NOTE - NSBHFUPINTERVALHXFT_PSY_A_CORE
Chart reviewed. Per nursing notes, overnight pt attempted to walk out exit door, but was walked back to bed without issue. Pt taking psychiatric medication as scheduled.  Pt assessed at bedside this afternoon. He appeared lying in bed in NAD. Reports mood as "disappointed". Explains that he feels his life in [place] is "boring" and he would prefer to be at an assisted living facility in [facility]. Discussed with pt the dangers of eloping from his facility, including his lack of oxygen which he needs at home. Discussed plan for transport back to his former facility, where he could work with support staff in attempting to secure a residence elsewhere, if desired. Pt describes he has been particularly disappointed by the decreased in activities at his residence following covid -- describes he used to enjoy going outside for picnics and taking trips to [place] with his residence. Pt is understanding of plan to return to residence and states he would like to work towards transferring facilities. Reinforced safety measures with pt, including not leaving facility for extended period given his need for home O2 and other health limitations. Pt expressed understanding.  On exam, pt denied SI/HI/AVH. He expressed some possibly delusional content at end of interview; when asked what he does in his spare time, responded "I work for the [employer] and make multiple charitable gifts".

## 2023-07-18 NOTE — PROGRESS NOTE ADULT - ASSESSMENT
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

## 2023-07-18 NOTE — PROGRESS NOTE ADULT - PROBLEM SELECTOR PLAN 8
Mildly elevated BP at this time. No anti-HTN med on medication list from facility    - Start anti-HTN as needed
Mildly elevated BP at this time. No anti-HTN med on medication list from facility    - Start anti-HTN as needed

## 2023-07-18 NOTE — PROGRESS NOTE ADULT - PROBLEM SELECTOR PLAN 7
Unclear when patient was diagnosed with AML. Per patient, it was recent and takes imatinib 400mg daily    - Obtain collateral for nursing home regarding AML diagnosis and restart imatinib as appropriate
Unclear when patient was diagnosed with AML. Per patient, it was recent and takes imatinib 400mg daily    - Obtain collateral for nursing home regarding AML diagnosis and restart imatinib as appropriate

## 2023-07-19 VITALS
SYSTOLIC BLOOD PRESSURE: 163 MMHG | DIASTOLIC BLOOD PRESSURE: 88 MMHG | HEART RATE: 63 BPM | RESPIRATION RATE: 18 BRPM | TEMPERATURE: 97 F | OXYGEN SATURATION: 98 %

## 2023-07-19 PROBLEM — Z00.00 ENCOUNTER FOR PREVENTIVE HEALTH EXAMINATION: Status: ACTIVE | Noted: 2023-07-19

## 2023-07-19 PROCEDURE — 70450 CT HEAD/BRAIN W/O DYE: CPT

## 2023-07-19 PROCEDURE — 99285 EMERGENCY DEPT VISIT HI MDM: CPT

## 2023-07-19 PROCEDURE — 99239 HOSP IP/OBS DSCHRG MGMT >30: CPT

## 2023-07-19 PROCEDURE — 86780 TREPONEMA PALLIDUM: CPT

## 2023-07-19 PROCEDURE — 84100 ASSAY OF PHOSPHORUS: CPT

## 2023-07-19 PROCEDURE — 71046 X-RAY EXAM CHEST 2 VIEWS: CPT

## 2023-07-19 PROCEDURE — 80053 COMPREHEN METABOLIC PANEL: CPT

## 2023-07-19 PROCEDURE — 80048 BASIC METABOLIC PNL TOTAL CA: CPT

## 2023-07-19 PROCEDURE — 94640 AIRWAY INHALATION TREATMENT: CPT

## 2023-07-19 PROCEDURE — 83735 ASSAY OF MAGNESIUM: CPT

## 2023-07-19 PROCEDURE — 80307 DRUG TEST PRSMV CHEM ANLYZR: CPT

## 2023-07-19 PROCEDURE — 36415 COLL VENOUS BLD VENIPUNCTURE: CPT

## 2023-07-19 PROCEDURE — 85025 COMPLETE CBC W/AUTO DIFF WBC: CPT

## 2023-07-19 PROCEDURE — 85027 COMPLETE CBC AUTOMATED: CPT

## 2023-07-19 PROCEDURE — 82607 VITAMIN B-12: CPT

## 2023-07-19 PROCEDURE — 81001 URINALYSIS AUTO W/SCOPE: CPT

## 2023-07-19 PROCEDURE — 84443 ASSAY THYROID STIM HORMONE: CPT

## 2023-07-19 RX ADMIN — ENOXAPARIN SODIUM 40 MILLIGRAM(S): 100 INJECTION SUBCUTANEOUS at 06:33

## 2023-07-19 RX ADMIN — OLANZAPINE 10 MILLIGRAM(S): 15 TABLET, FILM COATED ORAL at 09:39

## 2023-07-19 RX ADMIN — DIVALPROEX SODIUM 500 MILLIGRAM(S): 500 TABLET, DELAYED RELEASE ORAL at 09:39

## 2023-07-19 RX ADMIN — Medication 100 MICROGRAM(S): at 06:33

## 2023-07-19 RX ADMIN — Medication 5 MILLIGRAM(S): at 06:33

## 2023-07-19 RX ADMIN — BUDESONIDE AND FORMOTEROL FUMARATE DIHYDRATE 2 PUFF(S): 160; 4.5 AEROSOL RESPIRATORY (INHALATION) at 06:33

## 2023-07-19 NOTE — DISCHARGE NOTE NURSING/CASE MANAGEMENT/SOCIAL WORK - NSPROEXTENSIONSOFSELF_GEN_A_NUR
Scrotal ultrasound

 

HISTORY: Right testicle swelling. Groin cellulitis.

 

TECHNIQUE: Grayscale, color Doppler and spectral waveform analysis.

 

FINDINGS:

Right side:

Right testicle measures 5.5 x 3.5 x 3.0 cm. Homogeneous echotexture 

without focal lesion. Vascularity is as expected with intact blood supply.

Small hydrocele. Small epididymal head cyst measures about 4 mm, 

epididymis appears otherwise unremarkable.

 

Left side:

Left testicle measures 5.2 x 3.6 x 2.7 cm with homogeneous echotexture and

no focal lesion. Vascularity is as expected and blood supply is intact. 

Small hydrocele. Left epididymis contains a small epididymal head cyst 

measuring about 3 mm. Left epididymis is slightly larger and more vascular

than the right epididymis, but not definitely abnormal.

 

IMPRESSION:

1. No intrinsic testicle abnormality.

2. Small subcentimeter epididymal head cysts.

3. The left epididymis is slightly larger and more vascular than the right

epididymis. Uncertain significance, but correlate for symptoms of left 

epididymitis, and consider follow-up ultrasound as indicated.

 

Electronically signed by: Sebas Gonzalez MD (1/13/2020 9:43 AM) [facility] none

## 2023-07-19 NOTE — DISCHARGE NOTE NURSING/CASE MANAGEMENT/SOCIAL WORK - PATIENT PORTAL LINK FT
You can access the FollowMyHealth Patient Portal offered by [facility] by registering at the following website: [email or web address]. By joining [facility] ’s FollowMyHealth portal, you will also be able to view your health information using other applications (apps) compatible with our system.

## 2023-07-19 NOTE — DISCHARGE NOTE NURSING/CASE MANAGEMENT/SOCIAL WORK - NSDCFUADDAPPT_GEN_ALL_CORE_FT
Continue to f/u outpatient with PCP and psychiatrist at [facility].  ****please follow up chest ct outpatient for lung nodule on CXR

## 2023-07-19 NOTE — DISCHARGE NOTE NURSING/CASE MANAGEMENT/SOCIAL WORK - NSDCPEFALRISK_GEN_ALL_CORE
For information on Fall & Injury Prevention, visit: [email or web address] OR  [email or web address] OR  https://www.cdc.gov/steadi/patient.html

## 2023-07-24 DIAGNOSIS — E03.9 HYPOTHYROIDISM, UNSPECIFIED: ICD-10-CM

## 2023-07-24 DIAGNOSIS — Z91.51 PERSONAL HISTORY OF SUICIDAL BEHAVIOR: ICD-10-CM

## 2023-07-24 DIAGNOSIS — F20.9 SCHIZOPHRENIA, UNSPECIFIED: ICD-10-CM

## 2023-07-24 DIAGNOSIS — R41.82 ALTERED MENTAL STATUS, UNSPECIFIED: ICD-10-CM

## 2023-07-24 DIAGNOSIS — N40.0 BENIGN PROSTATIC HYPERPLASIA WITHOUT LOWER URINARY TRACT SYMPTOMS: ICD-10-CM

## 2023-07-24 DIAGNOSIS — K21.9 GASTRO-ESOPHAGEAL REFLUX DISEASE WITHOUT ESOPHAGITIS: ICD-10-CM

## 2023-07-24 DIAGNOSIS — F31.9 BIPOLAR DISORDER, UNSPECIFIED: ICD-10-CM

## 2023-07-24 DIAGNOSIS — J96.10 CHRONIC RESPIRATORY FAILURE, UNSPECIFIED WHETHER WITH HYPOXIA OR HYPERCAPNIA: ICD-10-CM

## 2023-07-24 DIAGNOSIS — T43.506A UNDERDOSING OF UNSPECIFIED ANTIPSYCHOTICS AND NEUROLEPTICS, INITIAL ENCOUNTER: ICD-10-CM

## 2023-07-24 DIAGNOSIS — R91.1 SOLITARY PULMONARY NODULE: ICD-10-CM

## 2023-07-24 DIAGNOSIS — G93.41 METABOLIC ENCEPHALOPATHY: ICD-10-CM

## 2023-07-24 DIAGNOSIS — J44.9 CHRONIC OBSTRUCTIVE PULMONARY DISEASE, UNSPECIFIED: ICD-10-CM

## 2023-07-24 DIAGNOSIS — Z99.81 DEPENDENCE ON SUPPLEMENTAL OXYGEN: ICD-10-CM

## 2023-07-24 DIAGNOSIS — Z79.890 HORMONE REPLACEMENT THERAPY: ICD-10-CM

## 2023-07-24 DIAGNOSIS — I10 ESSENTIAL (PRIMARY) HYPERTENSION: ICD-10-CM

## 2023-07-24 DIAGNOSIS — C92.00 ACUTE MYELOBLASTIC LEUKEMIA, NOT HAVING ACHIEVED REMISSION: ICD-10-CM

## 2023-07-24 DIAGNOSIS — Z87.891 PERSONAL HISTORY OF NICOTINE DEPENDENCE: ICD-10-CM

## 2023-07-24 DIAGNOSIS — Z91.148 PATIENT'S OTHER NONCOMPLIANCE WITH MEDICATION REGIMEN FOR OTHER REASON: ICD-10-CM

## 2024-03-22 NOTE — BH CONSULTATION LIAISON ASSESSMENT NOTE - PROFESSIONAL COLLATERAL RELATIONSHIP
ED to inpatient nurses report      Chief Complaint:  Chief Complaint   Patient presents with    Chest Pain     Present to ED from: home    MOA:     LOC: alert and orientated to name, place, date  Mobility: Requires assistance * 1  Oxygen Baseline: none    Current needs required: none     Code Status:   Prior    What abnormal results were found and what did you give/do to treat them? See labs, imaging.  Any procedures or intervention occur? See MAR.    Mental Status:  Level of Consciousness: Alert (0)    Psych Assessment:        Vitals:  Patient Vitals for the past 24 hrs:   BP Temp Temp src Pulse Resp SpO2 Height Weight   03/21/24 2343 (!) 165/113 -- -- 64 -- -- -- --   03/21/24 2243 (!) 144/107 -- -- 65 16 95 % -- --   03/21/24 2223 (!) 158/98 -- -- 66 18 95 % -- --   03/21/24 2213 (!) 149/98 -- -- 67 15 95 % -- --   03/21/24 2200 (!) 165/106 -- -- -- -- -- -- --   03/21/24 2157 -- 97.9 °F (36.6 °C) -- -- -- -- -- --   03/21/24 2156 (!) 170/126 -- Oral 67 18 94 % 1.727 m (5' 8\") 98 kg (216 lb)        LDAs:   Peripheral IV 03/21/24 Left Antecubital (Active)   Site Assessment Clean, dry & intact 03/21/24 2159   Line Status Blood return noted;Flushed 03/21/24 2159       Ambulatory Status:  No data recorded    Diagnosis:  DISPOSITION Admitted 03/21/2024 11:45:41 PM   Final diagnoses:   ACS (acute coronary syndrome) (HCC)   Chest pain with high risk for cardiac etiology        Consults:  IP CONSULT TO CARDIOLOGY     Pain Score:  Pain Assessment  Pain Assessment: 0-10  Pain Level: 5  Pain Location: Chest  Pain Frequency: Continuous  Pain Onset: On-going    C-SSRS:   Risk of Suicide: No Risk    Sepsis Screening:  Sepsis Risk Score: 0.94    [place] Fall Risk:       Swallow Screening        Preferred Language:   English      ALLERGIES     Bee venom, Dilaudid [hydromorphone], Hydrochlorothiazide, Lisinopril, Other, Dilaudid [hydromorphone hcl], Nitrofuran derivatives, and Nitroglycerin    SURGICAL HISTORY       Past Surgical  Staff at [facility]